# Patient Record
Sex: MALE | Race: WHITE | NOT HISPANIC OR LATINO | Employment: FULL TIME | URBAN - NONMETROPOLITAN AREA
[De-identification: names, ages, dates, MRNs, and addresses within clinical notes are randomized per-mention and may not be internally consistent; named-entity substitution may affect disease eponyms.]

---

## 2018-06-05 ENCOUNTER — TRANSCRIBE ORDERS (OUTPATIENT)
Dept: MRI IMAGING | Facility: HOSPITAL | Age: 23
End: 2018-06-05

## 2018-06-11 ENCOUNTER — TRANSCRIBE ORDERS (OUTPATIENT)
Dept: ADMINISTRATIVE | Facility: HOSPITAL | Age: 23
End: 2018-06-11

## 2018-06-11 DIAGNOSIS — R79.89 ELEVATED LFTS: Primary | ICD-10-CM

## 2018-06-11 DIAGNOSIS — G47.39 MIXED SLEEP APNEA: ICD-10-CM

## 2018-06-25 ENCOUNTER — HOSPITAL ENCOUNTER (OUTPATIENT)
Dept: MRI IMAGING | Facility: HOSPITAL | Age: 23
Discharge: HOME | End: 2018-06-25

## 2018-06-25 ENCOUNTER — HOSPITAL ENCOUNTER (OUTPATIENT)
Dept: ULTRASOUND IMAGING | Facility: HOSPITAL | Age: 23
Discharge: HOME | End: 2018-06-25
Admitting: INTERNAL MEDICINE

## 2018-06-25 DIAGNOSIS — G47.39 MIXED SLEEP APNEA: ICD-10-CM

## 2018-06-25 DIAGNOSIS — R79.89 ELEVATED LFTS: ICD-10-CM

## 2018-06-25 PROCEDURE — 70544 MR ANGIOGRAPHY HEAD W/O DYE: CPT

## 2018-06-25 PROCEDURE — 70553 MRI BRAIN STEM W/O & W/DYE: CPT

## 2018-06-25 PROCEDURE — 0 GADOBENATE DIMEGLUMINE 529 MG/ML SOLUTION: Performed by: INTERNAL MEDICINE

## 2018-06-25 PROCEDURE — A9577 INJ MULTIHANCE: HCPCS | Performed by: INTERNAL MEDICINE

## 2018-06-25 PROCEDURE — 76700 US EXAM ABDOM COMPLETE: CPT | Performed by: RADIOLOGY

## 2018-06-25 PROCEDURE — 70544 MR ANGIOGRAPHY HEAD W/O DYE: CPT | Performed by: RADIOLOGY

## 2018-06-25 PROCEDURE — 70553 MRI BRAIN STEM W/O & W/DYE: CPT | Performed by: RADIOLOGY

## 2018-06-25 PROCEDURE — 76700 US EXAM ABDOM COMPLETE: CPT

## 2018-06-25 RX ADMIN — GADOBENATE DIMEGLUMINE 15 ML: 529 INJECTION, SOLUTION INTRAVENOUS at 09:45

## 2022-04-08 ENCOUNTER — APPOINTMENT (OUTPATIENT)
Dept: GENERAL RADIOLOGY | Facility: HOSPITAL | Age: 27
End: 2022-04-08

## 2022-04-08 ENCOUNTER — HOSPITAL ENCOUNTER (EMERGENCY)
Facility: HOSPITAL | Age: 27
Discharge: HOME | End: 2022-04-08
Attending: STUDENT IN AN ORGANIZED HEALTH CARE EDUCATION/TRAINING PROGRAM | Admitting: STUDENT IN AN ORGANIZED HEALTH CARE EDUCATION/TRAINING PROGRAM

## 2022-04-08 VITALS
TEMPERATURE: 98.6 F | HEIGHT: 70 IN | RESPIRATION RATE: 18 BRPM | WEIGHT: 175 LBS | SYSTOLIC BLOOD PRESSURE: 132 MMHG | BODY MASS INDEX: 25.05 KG/M2 | OXYGEN SATURATION: 99 % | HEART RATE: 91 BPM | DIASTOLIC BLOOD PRESSURE: 86 MMHG

## 2022-04-08 DIAGNOSIS — S61.412A LACERATION OF LEFT HAND WITHOUT FOREIGN BODY, INITIAL ENCOUNTER: Primary | ICD-10-CM

## 2022-04-08 PROCEDURE — 90471 IMMUNIZATION ADMIN: CPT | Performed by: PHYSICIAN ASSISTANT

## 2022-04-08 PROCEDURE — 25010000002 TETANUS-DIPHTH-ACELL PERTUSSIS 5-2.5-18.5 LF-MCG/0.5 SUSPENSION PREFILLED SYRINGE: Performed by: PHYSICIAN ASSISTANT

## 2022-04-08 PROCEDURE — 73130 X-RAY EXAM OF HAND: CPT

## 2022-04-08 PROCEDURE — 90715 TDAP VACCINE 7 YRS/> IM: CPT | Performed by: PHYSICIAN ASSISTANT

## 2022-04-08 PROCEDURE — 73130 X-RAY EXAM OF HAND: CPT | Performed by: RADIOLOGY

## 2022-04-08 PROCEDURE — 99283 EMERGENCY DEPT VISIT LOW MDM: CPT

## 2022-04-08 PROCEDURE — 63710000001 ONDANSETRON ODT 4 MG TABLET DISPERSIBLE: Performed by: PHYSICIAN ASSISTANT

## 2022-04-08 RX ORDER — SULFAMETHOXAZOLE AND TRIMETHOPRIM 800; 160 MG/1; MG/1
1 TABLET ORAL 2 TIMES DAILY
Qty: 14 TABLET | Refills: 0 | Status: SHIPPED | OUTPATIENT
Start: 2022-04-08 | End: 2022-04-15

## 2022-04-08 RX ORDER — IBUPROFEN 600 MG/1
600 TABLET ORAL 3 TIMES DAILY
Qty: 30 TABLET | Refills: 0 | Status: SHIPPED | OUTPATIENT
Start: 2022-04-08 | End: 2022-04-08 | Stop reason: SDUPTHER

## 2022-04-08 RX ORDER — HYDROCODONE BITARTRATE AND ACETAMINOPHEN 5; 325 MG/1; MG/1
1 TABLET ORAL ONCE
Status: COMPLETED | OUTPATIENT
Start: 2022-04-08 | End: 2022-04-08

## 2022-04-08 RX ORDER — LIDOCAINE HYDROCHLORIDE 10 MG/ML
10 INJECTION, SOLUTION EPIDURAL; INFILTRATION; INTRACAUDAL; PERINEURAL ONCE
Status: COMPLETED | OUTPATIENT
Start: 2022-04-08 | End: 2022-04-08

## 2022-04-08 RX ORDER — SULFAMETHOXAZOLE AND TRIMETHOPRIM 800; 160 MG/1; MG/1
1 TABLET ORAL 2 TIMES DAILY
Qty: 14 TABLET | Refills: 0 | Status: SHIPPED | OUTPATIENT
Start: 2022-04-08 | End: 2022-04-08 | Stop reason: SDUPTHER

## 2022-04-08 RX ORDER — IBUPROFEN 600 MG/1
600 TABLET ORAL 3 TIMES DAILY
Qty: 30 TABLET | Refills: 0 | Status: SHIPPED | OUTPATIENT
Start: 2022-04-08 | End: 2023-01-17

## 2022-04-08 RX ORDER — ONDANSETRON 4 MG/1
4 TABLET, ORALLY DISINTEGRATING ORAL ONCE
Status: COMPLETED | OUTPATIENT
Start: 2022-04-08 | End: 2022-04-08

## 2022-04-08 RX ORDER — SULFAMETHOXAZOLE AND TRIMETHOPRIM 800; 160 MG/1; MG/1
1 TABLET ORAL ONCE
Status: COMPLETED | OUTPATIENT
Start: 2022-04-08 | End: 2022-04-08

## 2022-04-08 RX ADMIN — SULFAMETHOXAZOLE AND TRIMETHOPRIM 1 TABLET: 800; 160 TABLET ORAL at 17:15

## 2022-04-08 RX ADMIN — TETANUS TOXOID, REDUCED DIPHTHERIA TOXOID AND ACELLULAR PERTUSSIS VACCINE, ADSORBED 0.5 ML: 5; 2.5; 8; 8; 2.5 SUSPENSION INTRAMUSCULAR at 17:15

## 2022-04-08 RX ADMIN — HYDROCODONE BITARTRATE AND ACETAMINOPHEN 1 TABLET: 5; 325 TABLET ORAL at 15:01

## 2022-04-08 RX ADMIN — LIDOCAINE HYDROCHLORIDE 10 ML: 10 INJECTION, SOLUTION EPIDURAL; INFILTRATION; INTRACAUDAL; PERINEURAL at 14:27

## 2022-04-08 RX ADMIN — ONDANSETRON 4 MG: 4 TABLET, ORALLY DISINTEGRATING ORAL at 17:16

## 2022-04-08 NOTE — ED PROVIDER NOTES
Subjective   25 yo male pt presents to the ED with complaints of laceration to left palm. Patient states he works at [employer] and cut his hand on a piece of machinery. Patient states his tetanus shot is not up to date. Patient denies any other injuries or complaints. Bleeding controlled.           Review of Systems   Constitutional: Negative.    HENT: Negative.    Eyes: Negative.    Respiratory: Negative.    Cardiovascular: Negative.    Gastrointestinal: Negative.    Endocrine: Negative.    Genitourinary: Negative.    Musculoskeletal: Negative.    Skin: Positive for wound.   Allergic/Immunologic: Negative.    Neurological: Negative.    Hematological: Negative.    Psychiatric/Behavioral: Negative.    All other systems reviewed and are negative.      No past medical history on file.    No Known Allergies    No past surgical history on file.    No family history on file.    Social History     Socioeconomic History   • Marital status: Single           Objective   Physical Exam  Vitals and nursing note reviewed.   Constitutional:       Appearance: Normal appearance. He is normal weight.   HENT:      Head: Normocephalic and atraumatic.      Right Ear: External ear normal.      Left Ear: External ear normal.      Nose: Nose normal.      Mouth/Throat:      Mouth: Mucous membranes are moist.      Pharynx: Oropharynx is clear.   Eyes:      Extraocular Movements: Extraocular movements intact.      Conjunctiva/sclera: Conjunctivae normal.      Pupils: Pupils are equal, round, and reactive to light.   Cardiovascular:      Rate and Rhythm: Normal rate and regular rhythm.      Pulses: Normal pulses.      Heart sounds: Normal heart sounds.   Pulmonary:      Effort: Pulmonary effort is normal.      Breath sounds: Normal breath sounds.   Abdominal:      General: Abdomen is flat. Bowel sounds are normal.      Palpations: Abdomen is soft.   Musculoskeletal:         General: Normal range of motion.      Cervical back: Normal range of  motion and neck supple.   Skin:     General: Skin is warm and dry.      Capillary Refill: Capillary refill takes less than 2 seconds.          Neurological:      General: No focal deficit present.      Mental Status: He is alert and oriented to person, place, and time. Mental status is at baseline.   Psychiatric:         Mood and Affect: Mood normal.         Behavior: Behavior normal.         Thought Content: Thought content normal.         Judgment: Judgment normal.         Laceration Repair    Date/Time: 4/8/2022 5:43 PM  Performed by: Ashley Clemons PA  Authorized by: Ed Coates DO     Consent:     Consent obtained:  Verbal    Consent given by:  Patient    Risks, benefits, and alternatives were discussed: yes      Risks discussed:  Infection, pain, retained foreign body, tendon damage, vascular damage, poor wound healing, poor cosmetic result, need for additional repair and nerve damage    Alternatives discussed:  No treatment, delayed treatment, observation and referral  Universal protocol:     Procedure explained and questions answered to patient or proxy's satisfaction: yes      Relevant documents present and verified: yes      Test results available: yes      Imaging studies available: yes      Required blood products, implants, devices, and special equipment available: yes      Site/side marked: yes      Immediately prior to procedure, a time out was called: yes      Patient identity confirmed:  Verbally with patient, arm band, provided demographic data and hospital-assigned identification number  Anesthesia:     Anesthesia method:  Local infiltration    Local anesthetic:  Lidocaine 1% w/o epi  Laceration details:     Location:  Hand    Hand location:  L palm    Length (cm):  5  Pre-procedure details:     Preparation:  Patient was prepped and draped in usual sterile fashion  Treatment:     Area cleansed with:  Chlorhexidine and povidone-iodine    Amount of cleaning:  Standard    Irrigation  solution:  Sterile saline    Irrigation method:  Syringe    Visualized foreign bodies/material removed: no    Skin repair:     Repair method:  Sutures    Suture size:  4-0    Suture material:  Nylon    Suture technique:  Simple interrupted    Number of sutures:  11  Repair type:     Repair type:  Simple  Post-procedure details:     Dressing:  Bulky dressing    Procedure completion:  Tolerated well, no immediate complications               ED Course  ED Course as of 04/08/22 1744   Fri Apr 08, 2022   1554 XR Hand 3+ View Left    IMPRESSION:  No acute bony abnormality      This report was finalized on 4/8/2022 3:34 PM by Dr. Alec Baocn MD.             Specimen Collected: 04/08/22 15:34 Last Resulted: 04/08/22 15:34         [ML]      ED Course User Index  [ML] Ashley Clemons PA                                                 MDM  Number of Diagnoses or Management Options     Amount and/or Complexity of Data Reviewed  Tests in the radiology section of CPT®: ordered and reviewed    Risk of Complications, Morbidity, and/or Mortality  Presenting problems: low  Diagnostic procedures: low  Management options: low    Patient Progress  Patient progress: improved      Final diagnoses:   Laceration of left hand without foreign body, initial encounter       ED Disposition  ED Disposition     ED Disposition   Discharge    Condition   Stable    Comment   --             No follow-up provider specified.       Medication List      New Prescriptions    ibuprofen 600 MG tablet  Commonly known as: ADVIL,MOTRIN  Take 1 tablet by mouth 3 (Three) Times a Day.     sulfamethoxazole-trimethoprim 800-160 MG per tablet  Commonly known as: BACTRIM DS,SEPTRA DS  Take 1 tablet by mouth 2 (Two) Times a Day for 7 days.           Where to Get Your Medications      These medications were sent to [facility]  [address] [place] [address]    Hours: 8AM-6PM Mon-Fri Phone: [phone]   · ibuprofen 600 MG  tablet  · sulfamethoxazole-trimethoprim 800-160 MG per tablet          Ashley Clemons, PA  04/08/22 1144

## 2022-04-08 NOTE — DISCHARGE INSTRUCTIONS
Call one of the offices below to establish a primary care provider.  If you are unable to get an appointment and feel it is an emergency and need to be seen immediately please return to the Emergency Department.    Call one of the office below to set up a primary care provider.    Dr. Omega Olson                                                                                                       [address] [facility] [address]  [phone]    Dr. Jones, Dr. PILY Clark, Dr. STORMY Clark ([facility])  [address] [facility] [address]  [phone]    Dr. David, Dr. Olmstead, Dr. Mcgowan ([facility])  [address] [facility] [address]  [phone]    Dr. Harris  [address] [facility] [address]  [phone]    Dr. Nielsen, Dr. Cole, Dr. Barboza, Dr. Simmons ([facility])  [address] [place] [address] [address]  [facility] [address]  [phone]    Dr. Eunice Dao  [address] [facility] [address]  [phone]    Dr. Laura Casey  [address]   [address]  [facility] [address]  [phone]    Dr. Olson  [address] [facility] [address]  [phone]    Dr. Cortez, Dr. John  [address] [facility] [address]  [phone]    Dr. Ramos  [address]                                                              [address]  [facility] [address]  [phone]    Dr. Campos  [address] [facility] [address]  [phone]    Dr. Lilo Connell  [address]  [address]  [facility] [address]  [phone]    Dr. Sanchez and [facility]   [address] [facility]  [address]  [place] [address]  [phone]               Follow up with your primary care provider in 1 week for suture removal.

## 2022-12-20 ENCOUNTER — TELEPHONE (OUTPATIENT)
Dept: CARDIOLOGY | Facility: CLINIC | Age: 27
End: 2022-12-20

## 2022-12-20 NOTE — TELEPHONE ENCOUNTER
Called and spoke with Patient, explained to him that with CP, and pressure with SOA, he really needed to go to the ER to be checked out. Patient understood, and stated that that if it continued, or worsened, he would go to the ED.

## 2022-12-22 ENCOUNTER — HOSPITAL ENCOUNTER (EMERGENCY)
Facility: HOSPITAL | Age: 27
Discharge: HOME | End: 2022-12-23
Attending: EMERGENCY MEDICINE | Admitting: EMERGENCY MEDICINE

## 2022-12-22 DIAGNOSIS — R00.2 PALPITATIONS: ICD-10-CM

## 2022-12-22 DIAGNOSIS — J01.90 ACUTE BACTERIAL SINUSITIS: Primary | ICD-10-CM

## 2022-12-22 DIAGNOSIS — R00.0 TACHYCARDIA: ICD-10-CM

## 2022-12-22 DIAGNOSIS — B96.89 ACUTE BACTERIAL SINUSITIS: Primary | ICD-10-CM

## 2022-12-22 PROCEDURE — 99284 EMERGENCY DEPT VISIT MOD MDM: CPT

## 2022-12-22 PROCEDURE — 93005 ELECTROCARDIOGRAM TRACING: CPT | Performed by: EMERGENCY MEDICINE

## 2022-12-22 PROCEDURE — 93005 ELECTROCARDIOGRAM TRACING: CPT

## 2022-12-22 RX ORDER — SODIUM CHLORIDE 0.9 % (FLUSH) 0.9 %
10 SYRINGE (ML) INJECTION AS NEEDED
Status: DISCONTINUED | OUTPATIENT
Start: 2022-12-22 | End: 2022-12-23 | Stop reason: HOSPADM

## 2022-12-23 ENCOUNTER — APPOINTMENT (OUTPATIENT)
Dept: GENERAL RADIOLOGY | Facility: HOSPITAL | Age: 27
End: 2022-12-23

## 2022-12-23 ENCOUNTER — APPOINTMENT (OUTPATIENT)
Dept: CT IMAGING | Facility: HOSPITAL | Age: 27
End: 2022-12-23

## 2022-12-23 VITALS
DIASTOLIC BLOOD PRESSURE: 83 MMHG | SYSTOLIC BLOOD PRESSURE: 131 MMHG | BODY MASS INDEX: 30.06 KG/M2 | RESPIRATION RATE: 18 BRPM | TEMPERATURE: 98.2 F | OXYGEN SATURATION: 98 % | HEIGHT: 70 IN | WEIGHT: 210 LBS | HEART RATE: 85 BPM

## 2022-12-23 LAB
ALBUMIN SERPL-MCNC: 4.7 G/DL (ref 3.5–5.2)
ALBUMIN/GLOB SERPL: 1.6 G/DL
ALP SERPL-CCNC: 64 U/L (ref 39–117)
ALT SERPL W P-5'-P-CCNC: 71 U/L (ref 1–41)
ANION GAP SERPL CALCULATED.3IONS-SCNC: 10 MMOL/L (ref 5–15)
AST SERPL-CCNC: 40 U/L (ref 1–40)
BASOPHILS # BLD AUTO: 0.1 10*3/MM3 (ref 0–0.2)
BASOPHILS NFR BLD AUTO: 1.1 % (ref 0–1.5)
BILIRUB SERPL-MCNC: 0.3 MG/DL (ref 0–1.2)
BUN SERPL-MCNC: 17 MG/DL (ref 6–20)
BUN/CREAT SERPL: 15.9 (ref 7–25)
CALCIUM SPEC-SCNC: 9.6 MG/DL (ref 8.6–10.5)
CHLORIDE SERPL-SCNC: 104 MMOL/L (ref 98–107)
CO2 SERPL-SCNC: 27 MMOL/L (ref 22–29)
CREAT SERPL-MCNC: 1.07 MG/DL (ref 0.76–1.27)
DEPRECATED RDW RBC AUTO: 42.1 FL (ref 37–54)
EGFRCR SERPLBLD CKD-EPI 2021: 96.9 ML/MIN/1.73
EOSINOPHIL # BLD AUTO: 0.2 10*3/MM3 (ref 0–0.4)
EOSINOPHIL NFR BLD AUTO: 2.1 % (ref 0.3–6.2)
ERYTHROCYTE [DISTWIDTH] IN BLOOD BY AUTOMATED COUNT: 13.2 % (ref 12.3–15.4)
GLOBULIN UR ELPH-MCNC: 2.9 GM/DL
GLUCOSE SERPL-MCNC: 162 MG/DL (ref 65–99)
HCT VFR BLD AUTO: 45.1 % (ref 37.5–51)
HGB BLD-MCNC: 15.3 G/DL (ref 13–17.7)
HOLD SPECIMEN: NORMAL
IMM GRANULOCYTES # BLD AUTO: 0.1 10*3/MM3 (ref 0–0.05)
IMM GRANULOCYTES NFR BLD AUTO: 1.1 % (ref 0–0.5)
LYMPHOCYTES # BLD AUTO: 2.15 10*3/MM3 (ref 0.7–3.1)
LYMPHOCYTES NFR BLD AUTO: 23 % (ref 19.6–45.3)
MAGNESIUM SERPL-MCNC: 2 MG/DL (ref 1.6–2.6)
MCH RBC QN AUTO: 29.7 PG (ref 26.6–33)
MCHC RBC AUTO-ENTMCNC: 33.9 G/DL (ref 31.5–35.7)
MCV RBC AUTO: 87.4 FL (ref 79–97)
MONOCYTES # BLD AUTO: 0.74 10*3/MM3 (ref 0.1–0.9)
MONOCYTES NFR BLD AUTO: 7.9 % (ref 5–12)
NEUTROPHILS NFR BLD AUTO: 6.04 10*3/MM3 (ref 1.7–7)
NEUTROPHILS NFR BLD AUTO: 64.8 % (ref 42.7–76)
NRBC BLD AUTO-RTO: 0 /100 WBC (ref 0–0.2)
PLATELET # BLD AUTO: 276 10*3/MM3 (ref 140–450)
PMV BLD AUTO: 10.7 FL (ref 6–12)
POTASSIUM SERPL-SCNC: 4 MMOL/L (ref 3.5–5.2)
PROT SERPL-MCNC: 7.6 G/DL (ref 6–8.5)
QT INTERVAL: 302 MS
QT INTERVAL: 322 MS
QTC INTERVAL: 413 MS
QTC INTERVAL: 416 MS
RBC # BLD AUTO: 5.16 10*6/MM3 (ref 4.14–5.8)
SODIUM SERPL-SCNC: 141 MMOL/L (ref 136–145)
TROPONIN T SERPL-MCNC: <0.01 NG/ML (ref 0–0.03)
TSH SERPL DL<=0.05 MIU/L-ACNC: 1.1 UIU/ML (ref 0.27–4.2)
WBC NRBC COR # BLD: 9.33 10*3/MM3 (ref 3.4–10.8)
WHOLE BLOOD HOLD COAG: NORMAL
WHOLE BLOOD HOLD SPECIMEN: NORMAL

## 2022-12-23 PROCEDURE — 80050 GENERAL HEALTH PANEL: CPT | Performed by: EMERGENCY MEDICINE

## 2022-12-23 PROCEDURE — 84484 ASSAY OF TROPONIN QUANT: CPT | Performed by: EMERGENCY MEDICINE

## 2022-12-23 PROCEDURE — 71045 X-RAY EXAM CHEST 1 VIEW: CPT

## 2022-12-23 PROCEDURE — 71275 CT ANGIOGRAPHY CHEST: CPT

## 2022-12-23 PROCEDURE — 0 IOPAMIDOL PER 1 ML: Performed by: EMERGENCY MEDICINE

## 2022-12-23 PROCEDURE — 96365 THER/PROPH/DIAG IV INF INIT: CPT

## 2022-12-23 PROCEDURE — 83735 ASSAY OF MAGNESIUM: CPT | Performed by: EMERGENCY MEDICINE

## 2022-12-23 PROCEDURE — 25010000002 MAGNESIUM SULFATE IN D5W 1G/100ML (PREMIX) 1-5 GM/100ML-% SOLUTION: Performed by: EMERGENCY MEDICINE

## 2022-12-23 PROCEDURE — 93005 ELECTROCARDIOGRAM TRACING: CPT | Performed by: EMERGENCY MEDICINE

## 2022-12-23 PROCEDURE — 96375 TX/PRO/DX INJ NEW DRUG ADDON: CPT

## 2022-12-23 RX ORDER — METOPROLOL TARTRATE 5 MG/5ML
5 INJECTION INTRAVENOUS ONCE
Status: COMPLETED | OUTPATIENT
Start: 2022-12-23 | End: 2022-12-23

## 2022-12-23 RX ORDER — AMOXICILLIN AND CLAVULANATE POTASSIUM 875; 125 MG/1; MG/1
1 TABLET, FILM COATED ORAL EVERY 12 HOURS SCHEDULED
Qty: 14 TABLET | Refills: 0 | Status: SHIPPED | OUTPATIENT
Start: 2022-12-23 | End: 2023-01-17

## 2022-12-23 RX ORDER — MAGNESIUM SULFATE 1 G/100ML
1 INJECTION INTRAVENOUS ONCE
Status: COMPLETED | OUTPATIENT
Start: 2022-12-23 | End: 2022-12-23

## 2022-12-23 RX ADMIN — MAGNESIUM SULFATE 1 G: 1 INJECTION INTRAVENOUS at 01:48

## 2022-12-23 RX ADMIN — SODIUM CHLORIDE 1000 ML: 9 INJECTION, SOLUTION INTRAVENOUS at 01:45

## 2022-12-23 RX ADMIN — IOPAMIDOL 80 ML: 755 INJECTION, SOLUTION INTRAVENOUS at 01:06

## 2022-12-23 RX ADMIN — METOPROLOL TARTRATE 5 MG: 1 INJECTION, SOLUTION INTRAVENOUS at 01:48

## 2022-12-23 NOTE — ED PROVIDER NOTES
EMERGENCY DEPARTMENT ENCOUNTER    Pt Name: Jan Chavez  MRN: [id number]  Pt :   1995  Room Number:    Date of encounter:  2022  PCP: Provider, No Known  ED Provider: Baltazar Mahoney MD    Historian: Patient      HPI:  Chief Complaint: chest pain and palpitation         Context: Jan Chavez is a 27 y.o. male who presents to the ED c/o patient onset 1 to 2 hours ago, while watching football, associated with some chest discomfort.  States prior history of the same, over the past 2 months.  Symptoms present for hours, but better here heart rate was 158 now he notices on his apple watch 110    Also complains of 2 weeks of sinus congestion drainage and pain, with an occasional cough  PAST MEDICAL HISTORY  No past medical history on file.      PAST SURGICAL HISTORY  No past surgical history on file.      FAMILY HISTORY  No family history on file.      SOCIAL HISTORY  Social History     Socioeconomic History   • Marital status: Single         ALLERGIES  Patient has no known allergies.        REVIEW OF SYSTEMS  Review of Systems       Constitutional: Negative. No fever, no weakness.   HENT: Negative for sneezing and sore throat.    Respiratory: Negative for cough. Negative for shortness of breath.    Cardiovascular: Negative.  Negative for chest pain.   Gastrointestinal: Negative.  Negative for abdominal pain.   Genitourinary: Negative.  Negative for difficulty urinating.     All systems reviewwed and negative except as noted in HPI.    PHYSICAL EXAM    I have reviewed the triage vital signs and nursing notes.    ED Triage Vitals [22 2349]   Temp Heart Rate Resp BP SpO2   98.2 °F (36.8 °C) 114 18 (!) 154/101 99 %      Temp src Heart Rate Source Patient Position BP Location FiO2 (%)   Oral -- -- -- --       Physical Exam  GENERAL:   Appears alert, oriented, appears concerned, or anxious  HENT: Nares patent.  EYES: No scleral icterus.  CV: Tachycardia no edema  RESPIRATORY: Normal effort.   No audible wheezes, rales or rhonchi.  ABDOMEN: Soft, nontender  MUSCULOSKELETAL: No deformities.   NEURO: Alert, moves all extremities, follows commands.  SKIN: Warm, dry, no rash visualized.        LAB RESULTS  Recent Results (from the past 24 hour(s))   ECG 12 Lead Chest Pain    Collection Time: 12/22/22 11:57 PM   Result Value Ref Range    QT Interval 302 ms    QTC Interval 416 ms   Green Top (Gel)    Collection Time: 12/23/22 12:36 AM   Result Value Ref Range    Extra Tube Hold for add-ons.    Lavender Top    Collection Time: 12/23/22 12:36 AM   Result Value Ref Range    Extra Tube hold for add-on    Gold Top - SST    Collection Time: 12/23/22 12:36 AM   Result Value Ref Range    Extra Tube Hold for add-ons.    Light Blue Top    Collection Time: 12/23/22 12:36 AM   Result Value Ref Range    Extra Tube Hold for add-ons.    Comprehensive Metabolic Panel    Collection Time: 12/23/22 12:36 AM    Specimen: Blood   Result Value Ref Range    Glucose 162 (H) 65 - 99 mg/dL    BUN 17 6 - 20 mg/dL    Creatinine 1.07 0.76 - 1.27 mg/dL    Sodium 141 136 - 145 mmol/L    Potassium 4.0 3.5 - 5.2 mmol/L    Chloride 104 98 - 107 mmol/L    CO2 27.0 22.0 - 29.0 mmol/L    Calcium 9.6 8.6 - 10.5 mg/dL    Total Protein 7.6 6.0 - 8.5 g/dL    Albumin 4.70 3.50 - 5.20 g/dL    ALT (SGPT) 71 (H) 1 - 41 U/L    AST (SGOT) 40 1 - 40 U/L    Alkaline Phosphatase 64 39 - 117 U/L    Total Bilirubin 0.3 0.0 - 1.2 mg/dL    Globulin 2.9 gm/dL    A/G Ratio 1.6 g/dL    BUN/Creatinine Ratio 15.9 7.0 - 25.0    Anion Gap 10.0 5.0 - 15.0 mmol/L    eGFR 96.9 >60.0 mL/min/1.73   Troponin    Collection Time: 12/23/22 12:36 AM    Specimen: Blood   Result Value Ref Range    Troponin T <0.010 0.000 - 0.030 ng/mL   CBC Auto Differential    Collection Time: 12/23/22 12:36 AM    Specimen: Blood   Result Value Ref Range    WBC 9.33 3.40 - 10.80 10*3/mm3    RBC 5.16 4.14 - 5.80 10*6/mm3    Hemoglobin 15.3 13.0 - 17.7 g/dL    Hematocrit 45.1 37.5 - 51.0 %    MCV  87.4 79.0 - 97.0 fL    MCH 29.7 26.6 - 33.0 pg    MCHC 33.9 31.5 - 35.7 g/dL    RDW 13.2 12.3 - 15.4 %    RDW-SD 42.1 37.0 - 54.0 fl    MPV 10.7 6.0 - 12.0 fL    Platelets 276 140 - 450 10*3/mm3    Neutrophil % 64.8 42.7 - 76.0 %    Lymphocyte % 23.0 19.6 - 45.3 %    Monocyte % 7.9 5.0 - 12.0 %    Eosinophil % 2.1 0.3 - 6.2 %    Basophil % 1.1 0.0 - 1.5 %    Immature Grans % 1.1 (H) 0.0 - 0.5 %    Neutrophils, Absolute 6.04 1.70 - 7.00 10*3/mm3    Lymphocytes, Absolute 2.15 0.70 - 3.10 10*3/mm3    Monocytes, Absolute 0.74 0.10 - 0.90 10*3/mm3    Eosinophils, Absolute 0.20 0.00 - 0.40 10*3/mm3    Basophils, Absolute 0.10 0.00 - 0.20 10*3/mm3    Immature Grans, Absolute 0.10 (H) 0.00 - 0.05 10*3/mm3    nRBC 0.0 0.0 - 0.2 /100 WBC   Magnesium    Collection Time: 12/23/22 12:36 AM    Specimen: Blood   Result Value Ref Range    Magnesium 2.0 1.6 - 2.6 mg/dL   TSH    Collection Time: 12/23/22 12:36 AM    Specimen: Blood   Result Value Ref Range    TSH 1.100 0.270 - 4.200 uIU/mL   ECG 12 Lead Chest Pain    Collection Time: 12/23/22  1:34 AM   Result Value Ref Range    QT Interval 322 ms    QTC Interval 413 ms       If labs were ordered, I independently reviewed the results.        RADIOLOGY  XR Chest 1 View    Result Date: 12/23/2022  EXAM:  XR CHEST 1 VW   DATE: 12/23/2022 12:12 AM HISTORY: chest pain COMPARISON:  None. FINDINGS and     1.  No consolidation or pleural effusion 2.  Heart size is normal. 3.  No acute bony findings. 4.  Incidental azygous lobe. Electronically signed by:  Dora Mathews M.D.  12/22/2022 11:21 PM Mountain Time    CT Angiogram Chest Pulmonary Embolism    Result Date: 12/23/2022  EXAM:  CTA CHEST WITH IV CONTRAST, CT PULMONARY ANGIOGRAM DATE: 12/23/2022 1:01 AM HISTORY:   Pulmonary embolism (PE) suspected, high prob TECHNIQUE: CTA examination of the chest was performed following the intravenous administration of 80 mL Isovue-370. Sagittal, coronal and 3-D reformatted images were provided. CT  dose lowering techniques were used, to include: automated exposure control,  adjustment for patient size, and or use of iterative reconstruction. COMPARISON:  None. FINDINGS: THYROID: Normal. LUNGS/PLEURA: Incidental azygous lobe. MEDIASTINUM/DORA: Normal. CARDIOVASCULAR:  Normal heart.   Unremarkable aorta.  Normal pulmonary arteries. CHEST WALL: Normal. MUSCULOSKELETAL: Normal. UPPER ABDOMEN: Normal.     1.  No pulmonary embolus. 2.  Unremarkable aorta Electronically signed by:  Dora Mathews M.D.  12/22/2022 11:21 PM Mountain Time      I ordered and reviewed the above noted radiographic studies.          PROCEDURES    Procedures    ECG 12 Lead Chest Pain   Preliminary Result   Test Reason : Chest Pain   Blood Pressure :   */*   mmHG   Vent. Rate :  99 BPM     Atrial Rate :  99 BPM      P-R Int : 126 ms          QRS Dur :  84 ms       QT Int : 322 ms       P-R-T Axes :  33  50  31 degrees      QTc Int : 413 ms      Normal sinus rhythm   Nonspecific T wave abnormality   Abnormal ECG   When compared with ECG of 22-DEC-2022 23:57,   No significant change was found      Referred By: EDMD           Confirmed By:       ECG 12 Lead Chest Pain   Final Result   Test Reason : Chest Pain   Blood Pressure :   */*   mmHG   Vent. Rate : 114 BPM     Atrial Rate : 114 BPM      P-R Int : 130 ms          QRS Dur :  82 ms       QT Int : 302 ms       P-R-T Axes :  25  60  27 degrees      QTc Int : 416 ms      Sinus tachycardia   Nonspecific T wave abnormality   Abnormal ECG   No previous ECGs available   Confirmed by EVAN ORTA ([id number]) on 12/23/2022 12:48:22 AM      Referred By: EDMD           Confirmed By: EVAN ORTA          MEDICATIONS GIVEN IN ER    Medications   sodium chloride 0.9 % flush 10 mL (has no administration in time range)   magnesium sulfate in D5W 1g/100mL (PREMIX) (1 g Intravenous New Bag 12/23/22 0148)   sodium chloride 0.9 % bolus 1,000 mL (1,000 mL Intravenous New Bag 12/23/22 0145)   metoprolol  tartrate (LOPRESSOR) injection 5 mg (5 mg Intravenous Given 12/23/22 0148)   iopamidol (ISOVUE-370) 76 % injection 100 mL (80 mL Intravenous Given 12/23/22 0106)         PROGRESS, DATA ANALYSIS, CONSULTS, AND MEDICAL DECISION MAKING    All labs have been independently reviewed by me.  All radiology studies have been reviewed by me and the radiologist dictating the report.   EKG's have been independently viewed and interpreted by me.      Differential diagnoses: Arrhythmia, chest pain, MI, pulmonary embolism.           CT scan shows no PE, pneumonia or mass.  EKG shows sinus tachycardia, with observation, and recheck it is normal sinus rhythm with a rate of 90.  We have trialed a dose of metoprolol here which she has tolerated.  I will recommend follow-up with his clinic physician, or cardiology whom he has an appointment for in 2 weeks, for chest pain follow-up, and consideration of Holter monitor for palpitations.      AS OF 02:02 EST VITALS:    BP - 129/94  HR - 118  TEMP - 98.2 °F (36.8 °C) (Oral)  O2 SATS - 97%                  DIAGNOSIS  Final diagnoses:   Acute bacterial sinusitis   Tachycardia   Palpitations         DISPOSITION  DISCHARGE    Patient discharged in stable condition.    Reviewed implications of results, diagnosis, meds, responsibility to follow up, warning signs and symptoms of possible worsening, potential complications and reasons to return to ER.    Patient/Family voiced understanding of above instructions.    Discussed plan for discharge, as there is no emergent indication for admission.  Pt/family is agreeable and understands need for follow up and possible repeat testing.  Pt/family is aware that discharge does not mean that nothing is wrong but that it indicates no emergency is currently present that requires admission and they must continue care with follow-up as given below or with a physician of their choice.     FOLLOW-UP  Provider, No Known  Psychiatric  [id number]               Medication List      New Prescriptions    amoxicillin-clavulanate 875-125 MG per tablet  Commonly known as: AUGMENTIN  Take 1 tablet by mouth Every 12 (Twelve) Hours.           Where to Get Your Medications      These medications were sent to [facility]/pharmacy #[id number] - [place], [place] - [address] - [phone]  - [phone]  [address] , [facility] [address]    Hours: 24-hours Phone: [phone]   · amoxicillin-clavulanate 875-125 MG per tablet                  Baltazar Mahoney MD  12/23/22 4879

## 2023-01-09 ENCOUNTER — TRANSCRIBE ORDERS (OUTPATIENT)
Dept: ADMINISTRATIVE | Facility: HOSPITAL | Age: 28
End: 2023-01-09
Payer: COMMERCIAL

## 2023-01-09 DIAGNOSIS — R06.83 SNORING: Primary | ICD-10-CM

## 2023-01-17 ENCOUNTER — OFFICE VISIT (OUTPATIENT)
Dept: CARDIOLOGY | Facility: CLINIC | Age: 28
End: 2023-01-17
Payer: COMMERCIAL

## 2023-01-17 ENCOUNTER — LAB (OUTPATIENT)
Dept: LAB | Facility: HOSPITAL | Age: 28
End: 2023-01-17
Payer: COMMERCIAL

## 2023-01-17 VITALS
HEART RATE: 79 BPM | OXYGEN SATURATION: 98 % | HEIGHT: 70 IN | WEIGHT: 198.4 LBS | SYSTOLIC BLOOD PRESSURE: 121 MMHG | BODY MASS INDEX: 28.4 KG/M2 | DIASTOLIC BLOOD PRESSURE: 83 MMHG

## 2023-01-17 DIAGNOSIS — R00.2 PALPITATIONS: ICD-10-CM

## 2023-01-17 DIAGNOSIS — R07.89 OTHER CHEST PAIN: ICD-10-CM

## 2023-01-17 DIAGNOSIS — R07.89 OTHER CHEST PAIN: Primary | ICD-10-CM

## 2023-01-17 PROBLEM — R07.9 CHRONIC CHEST PAIN WITH HIGH RISK FOR CAD: Status: ACTIVE | Noted: 2023-01-17

## 2023-01-17 PROBLEM — G89.29 CHRONIC CHEST PAIN WITH HIGH RISK FOR CAD: Status: ACTIVE | Noted: 2023-01-17

## 2023-01-17 PROBLEM — Z91.89 CHRONIC CHEST PAIN WITH HIGH RISK FOR CAD: Status: ACTIVE | Noted: 2023-01-17

## 2023-01-17 PROCEDURE — 93000 ELECTROCARDIOGRAM COMPLETE: CPT | Performed by: INTERNAL MEDICINE

## 2023-01-17 PROCEDURE — 36415 COLL VENOUS BLD VENIPUNCTURE: CPT

## 2023-01-17 PROCEDURE — 84443 ASSAY THYROID STIM HORMONE: CPT

## 2023-01-17 PROCEDURE — 84479 ASSAY OF THYROID (T3 OR T4): CPT

## 2023-01-17 PROCEDURE — 99204 OFFICE O/P NEW MOD 45 MIN: CPT | Performed by: INTERNAL MEDICINE

## 2023-01-17 PROCEDURE — 84436 ASSAY OF TOTAL THYROXINE: CPT

## 2023-01-17 RX ORDER — ISOSORBIDE MONONITRATE 30 MG/1
30 TABLET, EXTENDED RELEASE ORAL DAILY
Qty: 30 TABLET | Refills: 11 | Status: SHIPPED | OUTPATIENT
Start: 2023-01-17 | End: 2023-01-31 | Stop reason: SINTOL

## 2023-01-17 RX ORDER — OMEPRAZOLE 40 MG/1
40 CAPSULE, DELAYED RELEASE ORAL DAILY
COMMUNITY
End: 2023-02-01 | Stop reason: SDUPTHER

## 2023-01-17 NOTE — PROGRESS NOTES
Office Note    Subjective     Jan Chavez is a 27 y.o. male who presents to day for evaluation of chest pain and palpitations.      Active Problems:  Problem List Items Addressed This Visit        Cardiac and Vasculature    Palpitations    Relevant Orders    Adult Transthoracic Echo Complete w/ Color, Spectral and Contrast if necessary per protocol    Stress Test With Myocardial Perfusion One Day    ECG 12 Lead (Completed)    Cardiac Event Monitor    Thyroid Panel With TSH (Completed)   Other Visit Diagnoses     Other chest pain    -  Primary    Relevant Orders    Adult Transthoracic Echo Complete w/ Color, Spectral and Contrast if necessary per protocol    Stress Test With Myocardial Perfusion One Day    ECG 12 Lead (Completed)    Cardiac Event Monitor    Thyroid Panel With TSH (Completed)    H. Pylori Breath Test - Breath, Lung          HPI  Patient is here for evaluation.  Patient has had retrosternal chest pain with associated shortness of breath.  Patient has been to the emergency room in end of December also with similar complaints.  Sometimes the pain is 6/10 in intensity.  Sometimes associated with food and sometimes needs at rest.  Denies any nausea vomiting.  Has associated shortness of breath.  He is going to get a sleep study and evaluation for any nasal septal issues also.    Patient has extreme tiredness.  Patient also experiences a lot of palpitations no blackouts.  Patient does carry cardiac history with his great grand father having heart disease at a younger age.    PRIOR MEDS  Current Outpatient Medications on File Prior to Visit   Medication Sig Dispense Refill   • omeprazole (priLOSEC) 40 MG capsule Take 40 mg by mouth Daily.       No current facility-administered medications on file prior to visit.       ALLERGIES  Patient has no known allergies.    HISTORY  History reviewed. No pertinent past medical history.    Social History     Socioeconomic History   • Marital status: Single   Tobacco  "Use   • Smoking status: Never     Passive exposure: Never   • Smokeless tobacco: Current     Types: Snuff   Vaping Use   • Vaping Use: Never used   Substance and Sexual Activity   • Alcohol use: Never   • Drug use: Never   • Sexual activity: Defer       Family History   Problem Relation Age of Onset   • Hypertension Father    • Heart failure Paternal Grandfather    • Heart attack Paternal Grandfather        Review of Systems   Constitutional: Negative for activity change, appetite change, chills, diaphoresis, fatigue, fever and unexpected weight change.   HENT: Negative for congestion, ear discharge, ear pain, hearing loss, nosebleeds, sore throat and tinnitus.    Eyes: Negative for redness and visual disturbance.   Respiratory: Positive for cough, chest tightness and shortness of breath. Negative for apnea, choking, wheezing and stridor.    Cardiovascular: Positive for chest pain and palpitations. Negative for leg swelling.   Gastrointestinal: Negative for abdominal distention, abdominal pain, constipation, diarrhea, nausea and vomiting.   Endocrine: Negative for cold intolerance, heat intolerance, polydipsia, polyphagia and polyuria.   Genitourinary: Negative for difficulty urinating, flank pain, frequency, hematuria and urgency.   Musculoskeletal: Negative for arthralgias, back pain, gait problem, joint swelling, myalgias and neck pain.   Skin: Negative for pallor and rash.   Neurological: Negative for dizziness, tremors, seizures, syncope, weakness, light-headedness and headaches.   Hematological: Does not bruise/bleed easily.   Psychiatric/Behavioral: Negative for agitation, hallucinations, self-injury, sleep disturbance and suicidal ideas. The patient is not nervous/anxious.        Objective     VITALS: /83 (BP Location: Left arm, Patient Position: Sitting, Cuff Size: Large Adult)   Pulse 79   Ht 177.8 cm (70\")   Wt 90 kg (198 lb 6.4 oz)   SpO2 98%   BMI 28.47 kg/m²     LABS:   Lab on 01/17/2023 "   Component Date Value Ref Range Status   • TSH 01/17/2023 0.338  0.270 - 4.200 uIU/mL Final   • T Uptake 01/17/2023 1.00  0.80 - 1.30 TBI Final   • T4, Total 01/17/2023 7.12  4.50 - 11.70 mcg/dL Final    T4 results may be falsely increased if patient taking Biotin.   Admission on 12/22/2022, Discharged on 12/23/2022   Component Date Value Ref Range Status   • QT Interval 12/22/2022 302  ms Final   • QTC Interval 12/22/2022 416  ms Final   • Extra Tube 12/23/2022 Hold for add-ons.   Final    Auto resulted.   • Extra Tube 12/23/2022 hold for add-on   Final    Auto resulted   • Extra Tube 12/23/2022 Hold for add-ons.   Final    Auto resulted.   • Extra Tube 12/23/2022 Hold for add-ons.   Final    Auto resulted.   • Extra Tube 12/23/2022 Hold for add-ons.   Final    Auto resulted   • QT Interval 12/23/2022 322  ms Final   • QTC Interval 12/23/2022 413  ms Final   • Glucose 12/23/2022 162 (H)  65 - 99 mg/dL Final   • BUN 12/23/2022 17  6 - 20 mg/dL Final   • Creatinine 12/23/2022 1.07  0.76 - 1.27 mg/dL Final   • Sodium 12/23/2022 141  136 - 145 mmol/L Final   • Potassium 12/23/2022 4.0  3.5 - 5.2 mmol/L Final    Slight hemolysis detected by analyzer. Results may be affected.   • Chloride 12/23/2022 104  98 - 107 mmol/L Final   • CO2 12/23/2022 27.0  22.0 - 29.0 mmol/L Final   • Calcium 12/23/2022 9.6  8.6 - 10.5 mg/dL Final   • Total Protein 12/23/2022 7.6  6.0 - 8.5 g/dL Final   • Albumin 12/23/2022 4.70  3.50 - 5.20 g/dL Final   • ALT (SGPT) 12/23/2022 71 (H)  1 - 41 U/L Final   • AST (SGOT) 12/23/2022 40  1 - 40 U/L Final   • Alkaline Phosphatase 12/23/2022 64  39 - 117 U/L Final   • Total Bilirubin 12/23/2022 0.3  0.0 - 1.2 mg/dL Final   • Globulin 12/23/2022 2.9  gm/dL Final    Calculated Result   • A/G Ratio 12/23/2022 1.6  g/dL Final   • BUN/Creatinine Ratio 12/23/2022 15.9  7.0 - 25.0 Final   • Anion Gap 12/23/2022 10.0  5.0 - 15.0 mmol/L Final   • eGFR 12/23/2022 96.9  >60.0 mL/min/1.73 Final    National  Kidney Foundation and American Society of Nephrology (ASN) Task Force recommended calculation based on the Chronic Kidney Disease Epidemiology Collaboration (CKD-EPI) equation refit without adjustment for race.   • Troponin T 12/23/2022 <0.010  0.000 - 0.030 ng/mL Final   • WBC 12/23/2022 9.33  3.40 - 10.80 10*3/mm3 Final   • RBC 12/23/2022 5.16  4.14 - 5.80 10*6/mm3 Final   • Hemoglobin 12/23/2022 15.3  13.0 - 17.7 g/dL Final   • Hematocrit 12/23/2022 45.1  37.5 - 51.0 % Final   • MCV 12/23/2022 87.4  79.0 - 97.0 fL Final   • MCH 12/23/2022 29.7  26.6 - 33.0 pg Final   • MCHC 12/23/2022 33.9  31.5 - 35.7 g/dL Final   • RDW 12/23/2022 13.2  12.3 - 15.4 % Final   • RDW-SD 12/23/2022 42.1  37.0 - 54.0 fl Final   • MPV 12/23/2022 10.7  6.0 - 12.0 fL Final   • Platelets 12/23/2022 276  140 - 450 10*3/mm3 Final   • Neutrophil % 12/23/2022 64.8  42.7 - 76.0 % Final   • Lymphocyte % 12/23/2022 23.0  19.6 - 45.3 % Final   • Monocyte % 12/23/2022 7.9  5.0 - 12.0 % Final   • Eosinophil % 12/23/2022 2.1  0.3 - 6.2 % Final   • Basophil % 12/23/2022 1.1  0.0 - 1.5 % Final   • Immature Grans % 12/23/2022 1.1 (H)  0.0 - 0.5 % Final   • Neutrophils, Absolute 12/23/2022 6.04  1.70 - 7.00 10*3/mm3 Final   • Lymphocytes, Absolute 12/23/2022 2.15  0.70 - 3.10 10*3/mm3 Final   • Monocytes, Absolute 12/23/2022 0.74  0.10 - 0.90 10*3/mm3 Final   • Eosinophils, Absolute 12/23/2022 0.20  0.00 - 0.40 10*3/mm3 Final   • Basophils, Absolute 12/23/2022 0.10  0.00 - 0.20 10*3/mm3 Final   • Immature Grans, Absolute 12/23/2022 0.10 (H)  0.00 - 0.05 10*3/mm3 Final   • nRBC 12/23/2022 0.0  0.0 - 0.2 /100 WBC Final   • Magnesium 12/23/2022 2.0  1.6 - 2.6 mg/dL Final   • TSH 12/23/2022 1.100  0.270 - 4.200 uIU/mL Final         IMAGING:   XR Chest 1 View    Result Date: 12/23/2022  1.  No consolidation or pleural effusion 2.  Heart size is normal. 3.  No acute bony findings. 4.  Incidental azygous lobe. Electronically signed by:  Dora Mathews M.D.   12/22/2022 11:21 PM [place] [facility]    CT Angiogram Chest Pulmonary Embolism    Result Date: 12/23/2022  1.  No pulmonary embolus. 2.  Unremarkable aorta Electronically signed by:  Dora Mathews M.D.  12/22/2022 11:21 PM [place]    No results found for this or any previous visit.     No results found for this or any previous visit.          EXAM:  Physical Exam  Vitals and nursing note reviewed.   Constitutional:       General: He is not in acute distress.     Appearance: Normal appearance. He is not ill-appearing or diaphoretic.   HENT:      Head: Normocephalic and atraumatic.      Right Ear: External ear normal.      Left Ear: External ear normal.      Nose: Nose normal. No congestion or rhinorrhea.      Mouth/Throat:      Mouth: Mucous membranes are moist.      Pharynx: No oropharyngeal exudate.   Eyes:      Extraocular Movements: Extraocular movements intact.      Conjunctiva/sclera: Conjunctivae normal.      Pupils: Pupils are equal, round, and reactive to light.   Neck:      Vascular: No carotid bruit.   Cardiovascular:      Rate and Rhythm: Normal rate and regular rhythm.      Pulses: Normal pulses.      Heart sounds: Normal heart sounds, S1 normal and S2 normal. No murmur heard.   No systolic murmur is present.   No diastolic murmur is present.  Pulmonary:      Effort: Pulmonary effort is normal. No respiratory distress.      Breath sounds: Normal breath sounds. No wheezing, rhonchi or rales.   Abdominal:      General: Abdomen is flat. Bowel sounds are normal.      Palpations: Abdomen is soft.   Musculoskeletal:         General: No swelling, tenderness, deformity or signs of injury.      Cervical back: Normal range of motion and neck supple.      Right lower leg: No edema.      Left lower leg: No edema.   Skin:     General: Skin is warm.      Coloration: Skin is not jaundiced.      Findings: No bruising, erythema or rash.   Neurological:      General: No focal deficit present.      Mental Status: He is  alert and oriented to person, place, and time. Mental status is at baseline.   Psychiatric:         Mood and Affect: Mood normal.         Behavior: Behavior normal.         Thought Content: Thought content normal.         Judgment: Judgment normal.         Procedure     ECG 12 Lead    Date/Time: 1/17/2023 2:29 PM  Performed by: Vadim Vivas MD  Authorized by: Vadim Vivas MD   Comparison: compared with previous ECG from 12/22/2022  Comparison to previous ECG: On previous EKG T flattening noted in the inferolateral leads  Comments: EKG showed sinus rhythm at 87, normal axis, normal intervals, T inversion noted inferolateral leads.               Assessment & Plan     Diagnoses and all orders for this visit:    1. Other chest pain (Primary)  -     Cancel: POC Urea Breath Test  -     Adult Transthoracic Echo Complete w/ Color, Spectral and Contrast if necessary per protocol; Future  -     Stress Test With Myocardial Perfusion One Day; Future  -     ECG 12 Lead  -     Cardiac Event Monitor; Future  -     Thyroid Panel With TSH; Future  -     H. Pylori Breath Test - Breath, Lung; Future    2. Palpitations  -     Adult Transthoracic Echo Complete w/ Color, Spectral and Contrast if necessary per protocol; Future  -     Stress Test With Myocardial Perfusion One Day; Future  -     ECG 12 Lead  -     Cardiac Event Monitor; Future  -     Thyroid Panel With TSH; Future    Other orders  -     isosorbide mononitrate (IMDUR) 30 MG 24 hr tablet; Take 1 tablet by mouth Daily.  Dispense: 30 tablet; Refill: 11      Plan  #1 cardiac.  Patient has been having chest pains with palpitations.  Associated shortness of breath.  Need to rule out cardiac versus noncardiac causes of same.  We will get a stress and echo done.  We will check your blood test may need EGD also.  Event recorder would be placed.  TSH will be checked.  Aggressive risk factor modification coronary disease to continue.               MEDS ORDERED DURING  VISIT:    Medications Discontinued During This Encounter   Medication Reason   • amoxicillin-clavulanate (AUGMENTIN) 875-125 MG per tablet Historical Med - Therapy completed   • ibuprofen (ADVIL,MOTRIN) 600 MG tablet Historical Med - Therapy completed        New Medications Ordered This Visit   Medications   • isosorbide mononitrate (IMDUR) 30 MG 24 hr tablet     Sig: Take 1 tablet by mouth Daily.     Dispense:  30 tablet     Refill:  11         Follow Up:   Return in about 4 weeks (around 2/14/2023).    Patient was given instructions and counseling regarding his condition or for health maintenance advice. Please see specific information pulled into the AVS if appropriate.   As always, Provider, No Known  I appreciate very much the opportunity to participate in the cardiovascular care of your patients. Please do not hesitate to call me with any questions with regards to Jan Chavez evaluation and management.         This document has been electronically signed by Vadim Vivas MD [facility], Interventional Cardiology  January 30, 2023 17:45 EST    Dictated Utilizing Dragon Dictation: Part of this note may be an electronic transcription/translation of spoken language to printed text using the Dragon Dictation System.  .Electronically signed by Vadim Vivas MD, 01/17/23, 2:32 PM EST.

## 2023-01-18 LAB
T-UPTAKE NFR SERPL: 1 TBI (ref 0.8–1.3)
T4 SERPL-MCNC: 7.12 MCG/DL (ref 4.5–11.7)
TSH SERPL DL<=0.05 MIU/L-ACNC: 0.34 UIU/ML (ref 0.27–4.2)

## 2023-01-25 ENCOUNTER — PATIENT ROUNDING (BHMG ONLY) (OUTPATIENT)
Dept: CARDIOLOGY | Facility: CLINIC | Age: 28
End: 2023-01-25
Payer: COMMERCIAL

## 2023-01-25 NOTE — PROGRESS NOTES
January 25, 2023    Hello, may I speak with Jan Scott?    My name is Kaela      I am  with [facility] CARDIOLOG[facility]  [address]  [place] [address] [phone].    Before we get started may I verify your date of birth? 1995    I am calling to officially welcome you to our practice and ask about your recent visit. Is this a good time to talk? yes    Tell me about your visit with us. What things went well?  Visit went good, liked Dr. Vivas.       We're always looking for ways to make our patients' experiences even better. Do you have recommendations on ways we may improve?  no    Overall were you satisfied with your first visit to our practice? yes       I appreciate you taking the time to speak with me today. Is there anything else I can do for you? no      Thank you, and have a great day.

## 2023-01-30 ENCOUNTER — TELEPHONE (OUTPATIENT)
Dept: CARDIOLOGY | Facility: CLINIC | Age: 28
End: 2023-01-30
Payer: COMMERCIAL

## 2023-01-30 NOTE — TELEPHONE ENCOUNTER
spk with the patient concerning his current symptoms and he states the CP comes and goes with tightness and heavy feeling and once his HR got to 140s, complains the Imdur causes him unbearable headaches, that he is using Ibuprofen for, explained to the patient if symptoms continue to go the ED to be evaluated.

## 2023-01-31 ENCOUNTER — HOSPITAL ENCOUNTER (OUTPATIENT)
Dept: NUCLEAR MEDICINE | Facility: HOSPITAL | Age: 28
Discharge: HOME | End: 2023-01-31
Payer: COMMERCIAL

## 2023-01-31 ENCOUNTER — HOSPITAL ENCOUNTER (OUTPATIENT)
Dept: CARDIOLOGY | Facility: HOSPITAL | Age: 28
Discharge: HOME | End: 2023-01-31
Payer: COMMERCIAL

## 2023-01-31 DIAGNOSIS — R00.2 PALPITATIONS: ICD-10-CM

## 2023-01-31 DIAGNOSIS — R07.89 OTHER CHEST PAIN: ICD-10-CM

## 2023-01-31 LAB
BH CV ECHO MEAS - ACS: 1.9 CM
BH CV ECHO MEAS - AO MAX PG: 3.9 MMHG
BH CV ECHO MEAS - AO MEAN PG: 2 MMHG
BH CV ECHO MEAS - AO ROOT DIAM: 3 CM
BH CV ECHO MEAS - AO V2 MAX: 99 CM/SEC
BH CV ECHO MEAS - AO V2 VTI: 19.5 CM
BH CV ECHO MEAS - EDV(CUBED): 68.9 ML
BH CV ECHO MEAS - EDV(MOD-SP4): 74.4 ML
BH CV ECHO MEAS - EF(MOD-BP): 67 %
BH CV ECHO MEAS - EF(MOD-SP4): 67.6 %
BH CV ECHO MEAS - ESV(CUBED): 24.4 ML
BH CV ECHO MEAS - ESV(MOD-SP4): 24.1 ML
BH CV ECHO MEAS - FS: 29.3 %
BH CV ECHO MEAS - IVS/LVPW: 0.7 CM
BH CV ECHO MEAS - IVSD: 0.7 CM
BH CV ECHO MEAS - LA DIMENSION: 2.7 CM
BH CV ECHO MEAS - LAT PEAK E' VEL: 11.4 CM/SEC
BH CV ECHO MEAS - LV DIASTOLIC VOL/BSA (35-75): 35.8 CM2
BH CV ECHO MEAS - LV MASS(C)D: 105.6 GRAMS
BH CV ECHO MEAS - LV SYSTOLIC VOL/BSA (12-30): 11.6 CM2
BH CV ECHO MEAS - LVIDD: 4.1 CM
BH CV ECHO MEAS - LVIDS: 2.9 CM
BH CV ECHO MEAS - LVOT AREA: 3.8 CM2
BH CV ECHO MEAS - LVOT DIAM: 2.2 CM
BH CV ECHO MEAS - LVPWD: 1 CM
BH CV ECHO MEAS - MED PEAK E' VEL: 10.2 CM/SEC
BH CV ECHO MEAS - MV A MAX VEL: 51 CM/SEC
BH CV ECHO MEAS - MV E MAX VEL: 63 CM/SEC
BH CV ECHO MEAS - MV E/A: 1.24
BH CV ECHO MEAS - PA ACC TIME: 0.11 SEC
BH CV ECHO MEAS - PA PR(ACCEL): 31.3 MMHG
BH CV ECHO MEAS - RAP SYSTOLE: 10 MMHG
BH CV ECHO MEAS - RVSP: 22.7 MMHG
BH CV ECHO MEAS - SI(MOD-SP4): 24.2 ML/M2
BH CV ECHO MEAS - SV(MOD-SP4): 50.3 ML
BH CV ECHO MEAS - TAPSE (>1.6): 2.16 CM
BH CV ECHO MEAS - TR MAX PG: 12.7 MMHG
BH CV ECHO MEAS - TR MAX VEL: 178 CM/SEC
BH CV ECHO MEASUREMENTS AVERAGE E/E' RATIO: 5.83
BH CV NUCLEAR PRIOR STUDY: 3
BH CV REST NUCLEAR ISOTOPE DOSE: 10.5 MCI
BH CV STRESS BP STAGE 1: NORMAL
BH CV STRESS BP STAGE 2: NORMAL
BH CV STRESS DURATION MIN STAGE 1: 3
BH CV STRESS DURATION MIN STAGE 2: 3
BH CV STRESS DURATION MIN STAGE 3: 1
BH CV STRESS DURATION SEC STAGE 1: 0
BH CV STRESS DURATION SEC STAGE 2: 0
BH CV STRESS DURATION SEC STAGE 3: 30
BH CV STRESS GRADE STAGE 1: 10
BH CV STRESS GRADE STAGE 2: 12
BH CV STRESS GRADE STAGE 3: 14
BH CV STRESS HR STAGE 1: 108
BH CV STRESS HR STAGE 2: 157
BH CV STRESS HR STAGE 3: 187
BH CV STRESS METS STAGE 1: 5
BH CV STRESS METS STAGE 2: 7.5
BH CV STRESS METS STAGE 3: 10
BH CV STRESS NUCLEAR ISOTOPE DOSE: 31 MCI
BH CV STRESS PROTOCOL 1: NORMAL
BH CV STRESS RECOVERY BP: NORMAL MMHG
BH CV STRESS RECOVERY HR: 98 BPM
BH CV STRESS SPEED STAGE 1: 1.7
BH CV STRESS SPEED STAGE 2: 2.5
BH CV STRESS SPEED STAGE 3: 3.4
BH CV STRESS STAGE 1: 1
BH CV STRESS STAGE 2: 2
BH CV STRESS STAGE 3: 3
LV EF NUC BP: 70 %
MAXIMAL PREDICTED HEART RATE: 192 BPM
MAXIMAL PREDICTED HEART RATE: 192 BPM
PERCENT MAX PREDICTED HR: 97.4 %
STRESS BASELINE BP: NORMAL MMHG
STRESS BASELINE HR: 94 BPM
STRESS PERCENT HR: 115 %
STRESS POST ESTIMATED WORKLOAD: 10.1 METS
STRESS POST EXERCISE DUR MIN: 7 MIN
STRESS POST EXERCISE DUR SEC: 30 SEC
STRESS POST PEAK BP: NORMAL MMHG
STRESS POST PEAK HR: 187 BPM
STRESS TARGET HR: 163 BPM
STRESS TARGET HR: 163 BPM

## 2023-01-31 PROCEDURE — 93017 CV STRESS TEST TRACING ONLY: CPT

## 2023-01-31 PROCEDURE — A9500 TC99M SESTAMIBI: HCPCS | Performed by: INTERNAL MEDICINE

## 2023-01-31 PROCEDURE — 93018 CV STRESS TEST I&R ONLY: CPT | Performed by: INTERNAL MEDICINE

## 2023-01-31 PROCEDURE — 93306 TTE W/DOPPLER COMPLETE: CPT | Performed by: INTERNAL MEDICINE

## 2023-01-31 PROCEDURE — 78452 HT MUSCLE IMAGE SPECT MULT: CPT | Performed by: INTERNAL MEDICINE

## 2023-01-31 PROCEDURE — 0 TECHNETIUM SESTAMIBI: Performed by: INTERNAL MEDICINE

## 2023-01-31 PROCEDURE — 93306 TTE W/DOPPLER COMPLETE: CPT

## 2023-01-31 PROCEDURE — 78452 HT MUSCLE IMAGE SPECT MULT: CPT

## 2023-01-31 RX ORDER — METOPROLOL SUCCINATE 25 MG/1
25 TABLET, EXTENDED RELEASE ORAL DAILY
Qty: 90 TABLET | Refills: 3 | Status: SHIPPED | OUTPATIENT
Start: 2023-01-31 | End: 2023-03-14

## 2023-01-31 RX ADMIN — TECHNETIUM TC 99M SESTAMIBI 1 DOSE: 1 INJECTION INTRAVENOUS at 12:00

## 2023-01-31 RX ADMIN — TECHNETIUM TC 99M SESTAMIBI 1 DOSE: 1 INJECTION INTRAVENOUS at 10:27

## 2023-01-31 NOTE — TELEPHONE ENCOUNTER
CALLED THE PATIENT AND GAVE HIM THE NEW MED INSTRUCTIONS PER DR. MULLEN. TO STOP THE IMDUR, AND TO START THE METOPROLOL SUCCINATE 25MG qd, sent the prescription to the patients pharmacy.

## 2023-02-01 ENCOUNTER — DOCUMENTATION (OUTPATIENT)
Dept: CARDIOLOGY | Facility: CLINIC | Age: 28
End: 2023-02-01
Payer: COMMERCIAL

## 2023-02-01 DIAGNOSIS — R94.39 ABNORMAL STRESS TEST: Primary | ICD-10-CM

## 2023-02-01 RX ORDER — OMEPRAZOLE 40 MG/1
40 CAPSULE, DELAYED RELEASE ORAL DAILY
Qty: 90 CAPSULE | Refills: 3 | Status: SHIPPED | OUTPATIENT
Start: 2023-02-01

## 2023-02-08 PROBLEM — J34.2 DNS (DEVIATED NASAL SEPTUM): Status: ACTIVE | Noted: 2023-02-08

## 2023-02-08 PROBLEM — J34.89 NASAL VALVE STENOSIS: Status: ACTIVE | Noted: 2023-02-08

## 2023-02-08 PROBLEM — J34.3 HYPERTROPHY OF NASAL TURBINATES: Status: ACTIVE | Noted: 2023-02-08

## 2023-02-09 PROBLEM — R94.39 ABNORMAL STRESS TEST: Status: ACTIVE | Noted: 2023-02-09

## 2023-02-13 DIAGNOSIS — R94.39 ABNORMAL STRESS TEST: Primary | ICD-10-CM

## 2023-02-28 ENCOUNTER — TREATMENT (OUTPATIENT)
Dept: CARDIOLOGY | Facility: CLINIC | Age: 28
End: 2023-02-28
Payer: COMMERCIAL

## 2023-02-28 DIAGNOSIS — R07.89 OTHER CHEST PAIN: ICD-10-CM

## 2023-02-28 DIAGNOSIS — R00.2 PALPITATIONS: ICD-10-CM

## 2023-02-28 PROCEDURE — 93272 ECG/REVIEW INTERPRET ONLY: CPT | Performed by: INTERNAL MEDICINE

## 2023-03-07 ENCOUNTER — HOSPITAL ENCOUNTER (OUTPATIENT)
Dept: CT IMAGING | Facility: HOSPITAL | Age: 28
Discharge: HOME | End: 2023-03-07
Admitting: INTERNAL MEDICINE
Payer: COMMERCIAL

## 2023-03-07 VITALS
DIASTOLIC BLOOD PRESSURE: 73 MMHG | RESPIRATION RATE: 16 BRPM | OXYGEN SATURATION: 98 % | SYSTOLIC BLOOD PRESSURE: 140 MMHG | HEART RATE: 66 BPM

## 2023-03-07 DIAGNOSIS — R94.39 ABNORMAL STRESS TEST: ICD-10-CM

## 2023-03-07 PROCEDURE — 75574 CT ANGIO HRT W/3D IMAGE: CPT | Performed by: RADIOLOGY

## 2023-03-07 PROCEDURE — 25510000001 IOPAMIDOL PER 1 ML: Performed by: INTERNAL MEDICINE

## 2023-03-07 PROCEDURE — 75574 CT ANGIO HRT W/3D IMAGE: CPT

## 2023-03-07 RX ORDER — LIDOCAINE HYDROCHLORIDE 10 MG/ML
5 INJECTION, SOLUTION EPIDURAL; INFILTRATION; INTRACAUDAL; PERINEURAL AS NEEDED
OUTPATIENT
Start: 2023-03-07

## 2023-03-07 RX ORDER — NITROGLYCERIN 0.4 MG/1
TABLET SUBLINGUAL AS NEEDED
Status: COMPLETED | OUTPATIENT
Start: 2023-03-07 | End: 2023-03-07

## 2023-03-07 RX ORDER — METOPROLOL TARTRATE 100 MG/1
100 TABLET ORAL ONCE AS NEEDED
OUTPATIENT
Start: 2023-03-07

## 2023-03-07 RX ORDER — SODIUM CHLORIDE 0.9 % (FLUSH) 0.9 %
3 SYRINGE (ML) INJECTION EVERY 12 HOURS SCHEDULED
OUTPATIENT
Start: 2023-03-07

## 2023-03-07 RX ORDER — NITROGLYCERIN 0.4 MG/1
0.4 TABLET SUBLINGUAL
OUTPATIENT
Start: 2023-03-07 | End: 2023-03-07

## 2023-03-07 RX ORDER — METOPROLOL TARTRATE 50 MG/1
50 TABLET, FILM COATED ORAL ONCE AS NEEDED
OUTPATIENT
Start: 2023-03-07

## 2023-03-07 RX ORDER — SODIUM CHLORIDE 0.9 % (FLUSH) 0.9 %
10 SYRINGE (ML) INJECTION AS NEEDED
OUTPATIENT
Start: 2023-03-07

## 2023-03-07 RX ADMIN — IOPAMIDOL 88 ML: 755 INJECTION, SOLUTION INTRAVENOUS at 12:32

## 2023-03-07 RX ADMIN — NITROGLYCERIN 0.4 MG: 0.4 TABLET SUBLINGUAL at 12:35

## 2023-03-07 NOTE — NURSING NOTE
Procedure completed. Patient tolerated well, denies needs or complaints at this time. Will continue to monitor patient  post procedure.

## 2023-03-14 ENCOUNTER — OFFICE VISIT (OUTPATIENT)
Dept: CARDIOLOGY | Facility: CLINIC | Age: 28
End: 2023-03-14
Payer: COMMERCIAL

## 2023-03-14 VITALS
DIASTOLIC BLOOD PRESSURE: 82 MMHG | WEIGHT: 200.4 LBS | HEIGHT: 70 IN | BODY MASS INDEX: 28.69 KG/M2 | OXYGEN SATURATION: 100 % | SYSTOLIC BLOOD PRESSURE: 128 MMHG | HEART RATE: 71 BPM

## 2023-03-14 DIAGNOSIS — I47.1 PAROXYSMAL SVT (SUPRAVENTRICULAR TACHYCARDIA): Primary | ICD-10-CM

## 2023-03-14 DIAGNOSIS — R07.89 OTHER CHEST PAIN: ICD-10-CM

## 2023-03-14 DIAGNOSIS — R00.2 PALPITATIONS: Primary | ICD-10-CM

## 2023-03-14 PROCEDURE — 99214 OFFICE O/P EST MOD 30 MIN: CPT | Performed by: INTERNAL MEDICINE

## 2023-03-14 RX ORDER — METOPROLOL SUCCINATE 25 MG/1
37.5 TABLET, EXTENDED RELEASE ORAL DAILY
Qty: 90 TABLET | Refills: 3 | Status: SHIPPED | OUTPATIENT
Start: 2023-03-14

## 2023-03-14 RX ORDER — FLUOXETINE HYDROCHLORIDE 20 MG/1
20 CAPSULE ORAL DAILY
COMMUNITY

## 2023-03-14 NOTE — PROGRESS NOTES
Office Note    Subjective     Jan Chavez is a 27 y.o. male who presents to day for elevation of palpitation      Active Problems:  Problem List Items Addressed This Visit        Cardiac and Vasculature    Palpitations - Primary   Other Visit Diagnoses     Other chest pain              HPI  Patient was initially seen on [facility] 17 2023.  Patient has been having some heart rates going up on him even with minimal exertion.  Patient wore event recorder and his heart rate was fluctuating with a low of 50s to as high as 170 range.  Patient had another episode of fast heart rate, on yesterday with heart rate going in 160s.  Patient usually is not symptomatic from that.  When he gets heart rate go into this range sometime he feels jittery.  No blackouts.  Patient had a ischemic evaluation done with a echocardiogram done on [facility] 31st which showed normal EF.  Patient had a CTA coronaries done on March 7, 2023 which did not show any significant epicardial coronary disease.    PRIOR MEDS  Current Outpatient Medications on File Prior to Visit   Medication Sig Dispense Refill   • FLUoxetine (PROzac) 20 MG capsule Take 1 capsule by mouth Daily.     • omeprazole (priLOSEC) 40 MG capsule Take 1 capsule by mouth Daily. 90 capsule 3   • [DISCONTINUED] metoprolol succinate XL (TOPROL-XL) 25 MG 24 hr tablet Take 1 tablet by mouth Daily. 90 tablet 3     No current facility-administered medications on file prior to visit.       ALLERGIES  Patient has no known allergies.    HISTORY  History reviewed. No pertinent past medical history.    Social History     Socioeconomic History   • Marital status: Single   Tobacco Use   • Smoking status: Never     Passive exposure: Never   • Smokeless tobacco: Current     Types: Snuff   Vaping Use   • Vaping Use: Never used   Substance and Sexual Activity   • Alcohol use: Never   • Drug use: Never   • Sexual activity: Defer       Family History   Problem Relation Age of Onset   • Hypertension  "Father    • Heart failure Paternal Grandfather    • Heart attack Paternal Grandfather        Review of Systems   Constitutional: Negative for activity change, appetite change, chills, diaphoresis, fatigue, fever and unexpected weight change.   HENT: Negative for congestion, ear discharge, ear pain, hearing loss, nosebleeds, sore throat and tinnitus.    Eyes: Negative for redness and visual disturbance.   Respiratory: Positive for shortness of breath. Negative for apnea, cough, choking, chest tightness, wheezing and stridor.    Cardiovascular: Positive for palpitations. Negative for leg swelling.   Gastrointestinal: Negative for abdominal distention, abdominal pain, constipation, diarrhea, nausea and vomiting.   Endocrine: Negative for cold intolerance, heat intolerance, polydipsia, polyphagia and polyuria.   Genitourinary: Negative for difficulty urinating, flank pain, frequency, hematuria and urgency.   Musculoskeletal: Negative for arthralgias, back pain, gait problem, joint swelling, myalgias and neck pain.   Skin: Negative for pallor and rash.   Neurological: Negative for dizziness, tremors, seizures, syncope, weakness, light-headedness and headaches.   Hematological: Does not bruise/bleed easily.   Psychiatric/Behavioral: Negative for agitation, hallucinations, self-injury, sleep disturbance and suicidal ideas. The patient is not nervous/anxious.        Objective     VITALS: /82   Pulse 71   Ht 177.8 cm (70\")   Wt 90.9 kg (200 lb 6.4 oz)   SpO2 100%   BMI 28.75 kg/m²     LABS:   Hospital Outpatient Visit on 01/31/2023   Component Date Value Ref Range Status   • Target HR (85%) 01/31/2023 163  bpm Final   • Max. Pred. HR (100%) 01/31/2023 192  bpm Final   • LVIDd 01/31/2023 4.1  cm Final   • LVIDs 01/31/2023 2.9  cm Final   • IVSd 01/31/2023 0.70  cm Final   • LVPWd 01/31/2023 1.00  cm Final   • FS 01/31/2023 29.3  % Final   • IVS/LVPW 01/31/2023 0.70  cm Final   • ESV(cubed) 01/31/2023 24.4  ml Final "   • LV Sys Vol (BSA corrected) 01/31/2023 11.6  cm2 Final   • EDV(cubed) 01/31/2023 68.9  ml Final   • LV Carter Vol (BSA corrected) 01/31/2023 35.8  cm2 Final   • LVOT area 01/31/2023 3.8  cm2 Final   • LV mass(C)d 01/31/2023 105.6  grams Final   • LVOT diam 01/31/2023 2.20  cm Final   • EDV(MOD-sp4) 01/31/2023 74.4  ml Final   • ESV(MOD-sp4) 01/31/2023 24.1  ml Final   • SV(MOD-sp4) 01/31/2023 50.3  ml Final   • SI(MOD-sp4) 01/31/2023 24.2  ml/m2 Final   • EF(MOD-sp4) 01/31/2023 67.6  % Final   • MV E max isrrael 01/31/2023 63.0  cm/sec Final   • MV A max isrrael 01/31/2023 51.0  cm/sec Final   • MV E/A 01/31/2023 1.24   Final   • Med Peak E' Isrrael 01/31/2023 10.2  cm/sec Final   • Lat Peak E' Isrrael 01/31/2023 11.4  cm/sec Final   • Avg E/e' ratio 01/31/2023 5.83   Final   • TAPSE (>1.6) 01/31/2023 2.16  cm Final   • LA dimension (2D)  01/31/2023 2.7  cm Final   • Ao pk isrrael 01/31/2023 99.0  cm/sec Final   • Ao max PG 01/31/2023 3.9  mmHg Final   • Ao mean PG 01/31/2023 2.00  mmHg Final   • Ao V2 VTI 01/31/2023 19.5  cm Final   • TR max isrrael 01/31/2023 178.0  cm/sec Final   • TR max PG 01/31/2023 12.7  mmHg Final   • RVSP(TR) 01/31/2023 22.7  mmHg Final   • RAP systole 01/31/2023 10.0  mmHg Final   • PA acc time 01/31/2023 0.11  sec Final   • PA pr(Accel) 01/31/2023 31.3  mmHg Final   • Ao root diam 01/31/2023 3.0  cm Final   • ACS 01/31/2023 1.90  cm Final   • EF(MOD-bp) 01/31/2023 67.0  % Final   Hospital Outpatient Visit on 01/31/2023   Component Date Value Ref Range Status   • Target HR (85%) 01/31/2023 163  bpm Final   • Max. Pred. HR (100%) 01/31/2023 192  bpm Final   • Nuclear Prior Study 01/31/2023 3.0   Final   • BH CV REST NUCLEAR ISOTOPE DOSE 01/31/2023 10.5  mCi Final   • Exercise duration (min) 01/31/2023 7  min Final   • Exercise duration (sec) 01/31/2023 30  sec Final   • Estimated workload 01/31/2023 10.1  METS Final   • BH CV STRESS NUCLEAR ISOTOPE DOSE 01/31/2023 31.0  mCi Final   •  CV STRESS PROTOCOL 1  01/31/2023 Pascual   Final   • Stage 1 01/31/2023 1   Final   • HR Stage 1 01/31/2023 108   Final   • BP Stage 1 01/31/2023 140/74   Final   • Duration Min Stage 1 01/31/2023 3   Final   • Duration Sec Stage 1 01/31/2023 0   Final   • Grade Stage 1 01/31/2023 10   Final   • Speed Stage 1 01/31/2023 1.7   Final   • BH CV STRESS METS STAGE 1 01/31/2023 5   Final   • Stage 2 01/31/2023 2   Final   • HR Stage 2 01/31/2023 157   Final   • BP Stage 2 01/31/2023 153/65   Final   • Duration Min Stage 2 01/31/2023 3   Final   • Duration Sec Stage 2 01/31/2023 0   Final   • Grade Stage 2 01/31/2023 12   Final   • Speed Stage 2 01/31/2023 2.5   Final   • BH CV STRESS METS STAGE 2 01/31/2023 7.5   Final   • Stage 3 01/31/2023 3   Final   • HR Stage 3 01/31/2023 187   Final   • Duration Min Stage 3 01/31/2023 1   Final   • Duration Sec Stage 3 01/31/2023 30   Final   • Grade Stage 3 01/31/2023 14   Final   • Speed Stage 3 01/31/2023 3.4   Final   • BH CV STRESS METS STAGE 3 01/31/2023 10.0   Final   • Baseline HR 01/31/2023 94  bpm Final   • Baseline BP 01/31/2023 128/73  mmHg Final   • Peak HR 01/31/2023 187  bpm Final   • Percent Max Pred HR 01/31/2023 97.40  % Final   • Percent Target HR 01/31/2023 115  % Final   • Peak BP 01/31/2023 153/65  mmHg Final   • Recovery HR 01/31/2023 98  bpm Final   • Recovery BP 01/31/2023 146/57  mmHg Final   • Nuc Stress EF 01/31/2023 70  % Final   Lab on 01/17/2023   Component Date Value Ref Range Status   • TSH 01/17/2023 0.338  0.270 - 4.200 uIU/mL Final   • T Uptake 01/17/2023 1.00  0.80 - 1.30 TBI Final   • T4, Total 01/17/2023 7.12  4.50 - 11.70 mcg/dL Final    T4 results may be falsely increased if patient taking Biotin.   Admission on 12/22/2022, Discharged on 12/23/2022   Component Date Value Ref Range Status   • QT Interval 12/22/2022 302  ms Final   • QTC Interval 12/22/2022 416  ms Final   • Extra Tube 12/23/2022 Hold for add-ons.   Final    Auto resulted.   • Extra Tube 12/23/2022  hold for add-on   Final    Auto resulted   • Extra Tube 12/23/2022 Hold for add-ons.   Final    Auto resulted.   • Extra Tube 12/23/2022 Hold for add-ons.   Final    Auto resulted.   • Extra Tube 12/23/2022 Hold for add-ons.   Final    Auto resulted   • QT Interval 12/23/2022 322  ms Final   • QTC Interval 12/23/2022 413  ms Final   • Glucose 12/23/2022 162 (H)  65 - 99 mg/dL Final   • BUN 12/23/2022 17  6 - 20 mg/dL Final   • Creatinine 12/23/2022 1.07  0.76 - 1.27 mg/dL Final   • Sodium 12/23/2022 141  136 - 145 mmol/L Final   • Potassium 12/23/2022 4.0  3.5 - 5.2 mmol/L Final    Slight hemolysis detected by analyzer. Results may be affected.   • Chloride 12/23/2022 104  98 - 107 mmol/L Final   • CO2 12/23/2022 27.0  22.0 - 29.0 mmol/L Final   • Calcium 12/23/2022 9.6  8.6 - 10.5 mg/dL Final   • Total Protein 12/23/2022 7.6  6.0 - 8.5 g/dL Final   • Albumin 12/23/2022 4.70  3.50 - 5.20 g/dL Final   • ALT (SGPT) 12/23/2022 71 (H)  1 - 41 U/L Final   • AST (SGOT) 12/23/2022 40  1 - 40 U/L Final   • Alkaline Phosphatase 12/23/2022 64  39 - 117 U/L Final   • Total Bilirubin 12/23/2022 0.3  0.0 - 1.2 mg/dL Final   • Globulin 12/23/2022 2.9  gm/dL Final    Calculated Result   • A/G Ratio 12/23/2022 1.6  g/dL Final   • BUN/Creatinine Ratio 12/23/2022 15.9  7.0 - 25.0 Final   • Anion Gap 12/23/2022 10.0  5.0 - 15.0 mmol/L Final   • eGFR 12/23/2022 96.9  >60.0 mL/min/1.73 Final    National Kidney Foundation and American Society of Nephrology (ASN) Task Force recommended calculation based on the Chronic Kidney Disease Epidemiology Collaboration (CKD-EPI) equation refit without adjustment for race.   • Troponin T 12/23/2022 <0.010  0.000 - 0.030 ng/mL Final   • WBC 12/23/2022 9.33  3.40 - 10.80 10*3/mm3 Final   • RBC 12/23/2022 5.16  4.14 - 5.80 10*6/mm3 Final   • Hemoglobin 12/23/2022 15.3  13.0 - 17.7 g/dL Final   • Hematocrit 12/23/2022 45.1  37.5 - 51.0 % Final   • MCV 12/23/2022 87.4  79.0 - 97.0 fL Final   • MCH  12/23/2022 29.7  26.6 - 33.0 pg Final   • MCHC 12/23/2022 33.9  31.5 - 35.7 g/dL Final   • RDW 12/23/2022 13.2  12.3 - 15.4 % Final   • RDW-SD 12/23/2022 42.1  37.0 - 54.0 fl Final   • MPV 12/23/2022 10.7  6.0 - 12.0 fL Final   • Platelets 12/23/2022 276  140 - 450 10*3/mm3 Final   • Neutrophil % 12/23/2022 64.8  42.7 - 76.0 % Final   • Lymphocyte % 12/23/2022 23.0  19.6 - 45.3 % Final   • Monocyte % 12/23/2022 7.9  5.0 - 12.0 % Final   • Eosinophil % 12/23/2022 2.1  0.3 - 6.2 % Final   • Basophil % 12/23/2022 1.1  0.0 - 1.5 % Final   • Immature Grans % 12/23/2022 1.1 (H)  0.0 - 0.5 % Final   • Neutrophils, Absolute 12/23/2022 6.04  1.70 - 7.00 10*3/mm3 Final   • Lymphocytes, Absolute 12/23/2022 2.15  0.70 - 3.10 10*3/mm3 Final   • Monocytes, Absolute 12/23/2022 0.74  0.10 - 0.90 10*3/mm3 Final   • Eosinophils, Absolute 12/23/2022 0.20  0.00 - 0.40 10*3/mm3 Final   • Basophils, Absolute 12/23/2022 0.10  0.00 - 0.20 10*3/mm3 Final   • Immature Grans, Absolute 12/23/2022 0.10 (H)  0.00 - 0.05 10*3/mm3 Final   • nRBC 12/23/2022 0.0  0.0 - 0.2 /100 WBC Final   • Magnesium 12/23/2022 2.0  1.6 - 2.6 mg/dL Final   • TSH 12/23/2022 1.100  0.270 - 4.200 uIU/mL Final         IMAGING:   XR Chest 1 View    Result Date: 12/23/2022  1.  No consolidation or pleural effusion 2.  Heart size is normal. 3.  No acute bony findings. 4.  Incidental azygous lobe. Electronically signed by:  Dora Mathews M.D.  12/22/2022 11:21 PM [place]    CT Angiogram Chest Pulmonary Embolism    Result Date: 12/23/2022  1.  No pulmonary embolus. 2.  Unremarkable aorta Electronically signed by:  Dora Mathews M.D.  12/22/2022 11:21 PM [place]    CT Angiogram Coronary    Result Date: 3/8/2023    No acute findings in the coronary arteries.  This report was finalized on 3/8/2023 9:07 AM by Dr. Obed Manzano MD.      Results for orders placed during the hospital encounter of 01/31/23    Stress Test With Myocardial Perfusion One  Day    Interpretation Summary  •  Left ventricular ejection fraction is normal (Calculated EF = 70%).  •  Low risk for ischemic heart disease.  •  Myocardial perfusion imaging indicates a small-sized, mildly severe area of ischemia located in the anterior wall and lateral wall.  •  Impressions are consistent with an intermediate risk study.  •  TID 0.96.  •  Findings consistent with a normal ECG stress test.     No results found for this or any previous visit.          EXAM:  Constitutional:       General: Awake.      Appearance: Healthy appearance. Not in distress.   Eyes:      Conjunctiva/sclera: Conjunctivae normal.   HENT:      Head: Normocephalic and atraumatic.      Nose: Nose normal.    Mouth/Throat:      Pharynx: Oropharynx is clear.   Neck:      Thyroid: Thyroid normal.      Vascular: No carotid bruit or JVD.   Pulmonary:      Effort: Pulmonary effort is normal.      Breath sounds: Normal breath sounds.   Chest:      Chest wall: Not tender to palpatation.   Cardiovascular:      PMI at left midclavicular line. Normal rate. Regular rhythm. Normal S1 with normal intensity. Normal S2 with normal intensity.      Murmurs: There is no murmur.      No gallop. No rub.   Pulses:     Intact distal pulses.   Edema:     Peripheral edema absent.   Abdominal:      General: Bowel sounds are normal. There is no distension.      Palpations: Abdomen is soft. There is no hepatomegaly.      Tenderness: There is no abdominal tenderness.   Musculoskeletal: Normal range of motion.      Cervical back: Normal range of motion. Skin:     General: Skin is warm and dry. There is no cyanosis.      Coloration: Skin is not jaundiced.   Neurological:      Mental Status: Alert and oriented to person, place and time.      Motor: Motor function is intact.      Gait: Gait is intact.   Psychiatric:         Attention and Perception: Attention and perception normal.         Speech: Speech normal.         Behavior: Behavior is cooperative.          Cognition and Memory: Cognition and memory normal.         Judgment: Judgment normal.          Procedure   Procedures       Assessment & Plan     Diagnoses and all orders for this visit:    1. Palpitations (Primary)    2. Other chest pain    Other orders  -     metoprolol succinate XL (TOPROL-XL) 25 MG 24 hr tablet; Take 1.5 tablets by mouth Daily.  Dispense: 90 tablet; Refill: 3          PLAN  #1 cardiac.  Patient with episodes of palpitations with heart rate going in 160s 170s unprovoked.  Patient wore event recorder which showed supraventricular tachycardia only.  Patient is on beta-blockers.  Will go up on the metoprolol succinate to 37.5 daily.  May consider sending him for EP evaluation also to see whether it is a rhythm that needs to have ablation done for.  Patient had negative CTA coronaries done on March 7.  Echo on January 31, 2023 showed normal EF.  Patient had normal TSH in December 2022.  We will watch him closely and clinically.  Patient was advised to take an extra dose of metoprolol if his heart rate speeds up in 160s 170s range.  Referred to EP in [facility] ORDERED DURING VISIT:    Medications Discontinued During This Encounter   Medication Reason   • metoprolol succinate XL (TOPROL-XL) 25 MG 24 hr tablet Historical Med - Therapy completed        New Medications Ordered This Visit   Medications   • metoprolol succinate XL (TOPROL-XL) 25 MG 24 hr tablet     Sig: Take 1.5 tablets by mouth Daily.     Dispense:  90 tablet     Refill:  3         Follow Up:   Return in about 6 months (around 9/14/2023) for Recheck.    Patient was given instructions and counseling regarding his condition or for health maintenance advice. Please see specific information pulled into the AVS if appropriate.   As always, Meg Leo, APRN  I appreciate very much the opportunity to participate in the cardiovascular care of your patients. Please do not hesitate to call me with any questions with regards  to Jan Chavez evaluation and management.         This document has been electronically signed by Vadim Vivas MD [facility], Interventional Cardiology  March 14, 2023 13:46 EDT    Dictated Utilizing Dragon Dictation: Part of this note may be an electronic transcription/translation of spoken language to printed text using the Dragon Dictation System.

## 2023-04-04 NOTE — H&P
Chief complaint: Nasal obstruction; snoring; can't breath well  HPI: Sleep study ordered; snores all the time/ mouth breather; AST results - Spring 4/2022; wanted to discuss scheduling surgery; increased difficulty breathing through nose; always obstructed, one or other; not resolved      Review of Systems:    negative    History  No past medical history on file.  No past surgical history on file.  Family History   Problem Relation Age of Onset   • Hypertension Father    • Heart failure Paternal Grandfather    • Heart attack Paternal Grandfather      Social History     Tobacco Use   • Smoking status: Never     Passive exposure: Never   • Smokeless tobacco: Current     Types: Snuff   Vaping Use   • Vaping Use: Never used   Substance Use Topics   • Alcohol use: Never   • Drug use: Never     No medications prior to admission.     Allergies:  Patient has no known allergies.    Objective     Vital Signs  B/P 129/83  Temp: 97.6  WT: 197.2    Physical Exam:      General Appearance:    Alert, cooperative, in no acute distress   Head:    Normocephalic, without obvious abnormality, atraumatic   Eyes:            Lids and lashes normal, conjunctivae and sclerae normal, no   icterus, no pallor, corneas clear, PERRLA   Ears:    Ears appear intact with no abnormalities noted   Nose:        Throat:   Nasal septum into wall L; + Burnett maneuver with side wall collapse; very large nasal turbs        No oral lesions, no thrush, oral mucosa moist   Neck:   No adenopathy, supple, trachea midline, no thyromegaly, no   carotid bruit, no JVD; Thyroid- WNL         Lungs:     Clear to auscultation,respirations regular, even and                  unlabored    Heart:    Regular rhythm and normal rate, normal S1 and S2, no            murmur, no gallop, no rub, no click       Cranial Nerves:   1-12 WNL                                                    Assessment  DNS  VESTIBULAR STENOSIS  TURB HYPERTROPHY  AR    Plan  SEPTOPLASTY, SUBMUCOUS  RESECTION OF INFERIOR TURBINATES BILATERAL/SEPTOPLASTY SUBMUCOSAL RESECTION OF INFERIOR TURBINATES WITH NASAL VALVE IMPLANT( WITH LATERAL CRURAL GRAFTS)             Brett Hill MD  04/04/23  16:51 EDT

## 2023-04-17 NOTE — H&P
Chief complaint: Nasal obstruction; snoring; can't breath well  HPI: Sleep study ordered; snores all the time/ mouth breather; AST results - Spring 4/2022; wanted to discuss scheduling surgery; increased difficulty breathing through nose; always obstructed, one or other; not resolved      Review of Systems:    negative    History  No past medical history on file.  No past surgical history on file.  Family History   Problem Relation Age of Onset   • Hypertension Father    • Heart failure Paternal Grandfather    • Heart attack Paternal Grandfather      Social History     Tobacco Use   • Smoking status: Never     Passive exposure: Never   • Smokeless tobacco: Current     Types: Snuff   Vaping Use   • Vaping Use: Never used   Substance Use Topics   • Alcohol use: Never   • Drug use: Never     No medications prior to admission.     Allergies:  Patient has no known allergies.    Objective     Vital Signs  B/P 129/83  Temp: 97.6  WT: 197.2    Physical Exam:      General Appearance:    Alert, cooperative, in no acute distress   Head:    Normocephalic, without obvious abnormality, atraumatic   Eyes:            Lids and lashes normal, conjunctivae and sclerae normal, no   icterus, no pallor, corneas clear, PERRLA   Ears:    Ears appear intact with no abnormalities noted   Nose:        Throat:   Nasal septum into wall L; + Nicholas maneuver with side wall collapse; very large nasal turbs        No oral lesions, no thrush, oral mucosa moist   Neck:   No adenopathy, supple, trachea midline, no thyromegaly, no   carotid bruit, no JVD; Thyroid- WNL         Lungs:     Clear to auscultation,respirations regular, even and                  unlabored    Heart:    Regular rhythm and normal rate, normal S1 and S2, no            murmur, no gallop, no rub, no click       Cranial Nerves:   1-12 WNL                                                    Assessment  DNS  VESTIBULAR STENOSIS  TURB HYPERTROPHY  AR    Plan  SEPTOPLASTY, SUBMUCOUS  RESECTION OF INFERIOR TURBINATES BILATERAL/SEPTOPLASTY SUBMUCOSAL RESECTION OF INFERIOR TURBINATES WITH NASAL VALVE IMPLANT( WITH LATERAL CRURAL GRAFTS)             Brett Hill MD  04/17/23  10:01 EDT

## 2023-04-28 ENCOUNTER — ANESTHESIA EVENT (OUTPATIENT)
Dept: PERIOP | Facility: HOSPITAL | Age: 28
End: 2023-04-28
Payer: COMMERCIAL

## 2023-05-03 ENCOUNTER — ANESTHESIA (OUTPATIENT)
Dept: PERIOP | Facility: HOSPITAL | Age: 28
End: 2023-05-03
Payer: COMMERCIAL

## 2023-05-18 NOTE — H&P
Chief complaint: Nasal obstruction; snoring; can't breath well  HPI: Sleep study ordered; snores all the time/ mouth breather; AST results - Spring 4/2022; wanted to discuss scheduling surgery; increased difficulty breathing through nose; always obstructed, one or other; not resolved      Review of Systems:    negative    History  No past medical history on file.  No past surgical history on file.  Family History   Problem Relation Age of Onset   • Hypertension Father    • Heart failure Paternal Grandfather    • Heart attack Paternal Grandfather      Social History     Tobacco Use   • Smoking status: Never     Passive exposure: Never   • Smokeless tobacco: Current     Types: Snuff   Vaping Use   • Vaping Use: Never used   Substance Use Topics   • Alcohol use: Never   • Drug use: Never     No medications prior to admission.     Allergies:  Patient has no known allergies.    Objective     Vital Signs  B/P 129/83  Temp: 97.6  WT: 197.2    Physical Exam:      General Appearance:    Alert, cooperative, in no acute distress   Head:    Normocephalic, without obvious abnormality, atraumatic   Eyes:            Lids and lashes normal, conjunctivae and sclerae normal, no   icterus, no pallor, corneas clear, PERRLA   Ears:    Ears appear intact with no abnormalities noted   Nose:        Throat:   Nasal septum into wall L; + [place] maneuver with side wall collapse; very large nasal turbs        No oral lesions, no thrush, oral mucosa moist   Neck:   No adenopathy, supple, trachea midline, no thyromegaly, no   carotid bruit, no JVD; Thyroid- WNL         Lungs:     Clear to auscultation,respirations regular, even and                  unlabored    Heart:    Regular rhythm and normal rate, normal S1 and S2, no            murmur, no gallop, no rub, no click       Cranial Nerves:   1-12 WNL                                                    Assessment  DNS  VESTIBULAR STENOSIS  TURB HYPERTROPHY  AR    Plan  SEPTOPLASTY, SUBMUCOUS  RESECTION OF INFERIOR TURBINATES BILATERAL/SEPTOPLASTY SUBMUCOSAL RESECTION OF INFERIOR TURBINATES WITH NASAL VALVE IMPLANT( WITH LATERAL CRURAL GRAFTS)             Brett Hill MD  05/18/23  14:32 EDT

## 2023-05-25 NOTE — PROGRESS NOTES
Electrophysiology Clinic Consult     Jan Chavez  1995  [unfilled]  [unfilled]    05/31/23    DATE OF ADMISSION: (Not on file)  [facility] CARDIOLOGY MAIN [facility], April, APRN  [address] / [place] [address]  Referring Provider: Vadim Vivas MD     CC: palpitations    Problem list:  1. Inappropriate sinus tachycardia   a. Symptoms of palpitations 7 months ago   b. 30 day Holter monitor 1/2023: Avg HR 75, min HR 45, max . Runs of SVT  c. Started Metoprolol 1/2023  d. Echo 1/31/2023 : normal EF 66-70%, no significant valve disease  e. Stress test 1/2023: LVEF 70%, low risk for ischemic heart disease, myocardial perfusion study indicates small-sized, mildly severe area of ischemia located in anterior wall and lateral wall, impressions consistent with intermediate risk study, normal EKG stress test  f. CTA 3/2023: no significant coronary disease  2. Deviated nasal septum   3. Nasal valve stenosis  4. Surgeries:   a. Hand surgery      History of Present Illness:   Jan Chavez is a 27-year-old male with above past medical history referred by Dr. Vivas for palpitations and tachycardia. Symptoms of palpitations that started 7 months ago. Episodes can last 10-30 minutes with heart rate 160-170 bpm. He presented to the ER 12/2022 due to an episode but was not given any medication due to resolving prior to medication administration. He also has symptoms of SOB, fatigue and chest discomfort. Episodes can start when he is laying down, not correlated with activity.  His episodes of chest pain occur without tachycardia as well.  He started metoprolol 1/2023 and tachycardic episodes have decreased but he still has chest discomfort. Dr. Vivas suggested a [facility] but insurance would not approve. He had a CTA 3/2023 that revealed no significant coronary disease. He is scheduled for a septoplasty for deviated septum and nasal valve stenosis in June and presents today also for  cardiac clearance.  He does admit to dipping on a daily basis.  He has stopped high energy drinks and caffeine due to large amounts of coffee ingestion and since 7 months ago.  Has been treated with omeprazole for significant reflux disease which has improved in the past 6 months.  Caffeine: 1 soda, stopped coffee  Alcohol: rarely  Stimulant: none  Nicotine: dip every day, no smoking    No Known Allergies     Cannot display prior to admission medications because the patient has not been admitted in this contact.            Current Outpatient Medications:   •  FLUoxetine (PROzac) 20 MG capsule, Take 1 capsule by mouth Daily., Disp: , Rfl:   •  fluticasone (FLONASE) 50 MCG/ACT nasal spray, 2 sprays by Each Nare route Daily., Disp: , Rfl:   •  ipratropium (ATROVENT) 0.06 % nasal spray, ADMINISTER 2 SPRAYS IN EACH NOSTRIL THREE TIMES DAILY AS NEEDED FOR CONGESTION, Disp: , Rfl:   •  loratadine (CLARITIN) 10 MG tablet, TAKE 1 TABLET BY MOUTH AT AT BEDTIME, Disp: , Rfl:   •  metoprolol succinate XL (TOPROL-XL) 25 MG 24 hr tablet, Take 1.5 tablets by mouth Daily., Disp: 90 tablet, Rfl: 3  •  omeprazole (priLOSEC) 40 MG capsule, Take 1 capsule by mouth Daily., Disp: 90 capsule, Rfl: 3    Social History     Socioeconomic History   • Marital status: Single   Tobacco Use   • Smoking status: Former     Types: Cigarettes     Passive exposure: Never   • Smokeless tobacco: Current     Types: Snuff   Vaping Use   • Vaping Use: Never used   Substance and Sexual Activity   • Alcohol use: Never   • Drug use: Never   • Sexual activity: Defer       Family History   Problem Relation Age of Onset   • Hypertension Father    • Autoimmune disease Father    • Heart failure Paternal Grandfather    • Heart attack Paternal Grandfather    Father: tachycardia  Paternal grandfather: heart attack  Uncle: heart disease ( in 40s)     REVIEW OF SYSTEMS:   CONST:  No weight loss, fever, chills, weakness +fatigue.   HEENT:  No visual loss, blurred  "vision, double vision, yellow sclerae.                   No hearing loss, congestion, sore throat.   SKIN:      No rashes, urticaria, ulcers, sores.     RESP:     +shortness of breath, -hemoptysis, cough, sputum.   GI:           No anorexia, nausea, vomiting, diarrhea. No abdominal pain, melena.   :         No burning on urination, hematuria or increased frequency.  ENDO:    No diaphoresis, cold or heat intolerance. No polyuria or polydipsia.   NEURO:  No headache, +dizziness, -syncope, paralysis, ataxia, or parasthesias.                  No change in bowel or bladder control. No history of CVA/TIA  MUSC:    No muscle, back pain, joint pain or stiffness.   HEME:    No anemia, bleeding, bruising. No history of DVT/PE.  PSYCH:  No history of depression, anxiety    Vitals:    05/31/23 0927   BP: 116/78   BP Location: Right arm   Patient Position: Sitting   Pulse: 69   SpO2: 96%   Weight: 92.1 kg (203 lb)   Height: 177.8 cm (70\")                 Physical Exam:  GEN: Well nourished, well-developed, no acute distress  HEENT: Normocephalic, atraumatic, PERRLA, moist mucous membranes  NECK: Supple, NO JVD, no thyromegaly, no lymphadenopathy  CARD: Regular rate rhythm normal S1-S2.  No S3-S4 murmurs.  PMI normal.  No murmur   LUNGS: Clear to auscultation, normal respiratory effort  ABDOMEN: Soft, nontender, normal bowel sounds  EXTREMITIES: No gross deformities, no clubbing, cyanosis, or edema  SKIN: Warm, dry  NEURO: No focal deficits, alert and oriented x 3  PSYCHIATRIC: Normal affect and mood    MUSCULOSKELETAL: +chest discomfort with palpation     I personally viewed and interpreted the patient's EKG/Telemetry/lab data    Lab Results   Component Value Date    GLUCOSE 162 (H) 12/23/2022    CALCIUM 9.6 12/23/2022     12/23/2022    K 4.0 12/23/2022    CO2 27.0 12/23/2022     12/23/2022    BUN 17 12/23/2022    CREATININE 1.07 12/23/2022    BCR 15.9 12/23/2022    ANIONGAP 10.0 12/23/2022     Lab Results "   Component Value Date    WBC 9.33 12/23/2022    HGB 15.3 12/23/2022    HCT 45.1 12/23/2022    MCV 87.4 12/23/2022     12/23/2022     No results found for: INR, PROTIME  Lab Results   Component Value Date    TSH 0.338 01/17/2023    G9BLQGC 7.12 01/17/2023               ECG 12 Lead    Date/Time: 5/31/2023 10:23 AM  Performed by: Yonathan Beth MD  Authorized by: Yonathan Beth MD   Comparison: compared with previous ECG from 1/17/2023  Similar to previous ECG  Rhythm: sinus rhythm  Rate: normal  BPM: 69                ICD-10-CM ICD-9-CM   1. Inappropriate sinus tachycardia  R00.0 427.89   2. Chest pain, atypical  R07.89 786.59       Assessment and Plan:   1.  Narrow QRS tachycardia most likely consistent with inappropriate sinus tachycardia   -Episodes of palpitations, SOB, chest discomfort for 7 months. Describes gradual increase in heart rate during episodes and gradual decrease when episodes resolve.  -Holter monitor revealed episodes of sinus tachycardia; discussed possibility of STD SVT versus inappropriate sinus tachycardia.  On review of the event recorder, it appears to be more consistent with sinus tachycardia.  Discussed importance of exercise, weight loss, avoidance of caffeine and high energy drinks, etc.  -Will stop metoprolol and start Zebeta 7.5mg nightly     2. Atypical chest pain  -False positive stress test, CTA 3/2023: no significant coronary disease  -Started omeprozole with some improvement. Recommend GI referral to evaluate for GI work up related to chest pain; chest pain reproducible today on physical examination with palpation.  Most likely musculoskeletal in nature.  -May consider starting Mobic 15mg daily pending GI workup     3. Deviated septum   -Scheduled for Septoplasty. Will clear him for procedure and anesthesia.     Scribed for Yonathan Beth MD by VIJI Womack. 5/31/2023  10:10 EDT     I, Yonathan Beth MD, personally performed the services described in  this documentation as scribed by the above named individual in my presence, and it is both accurate and complete.  5/31/2023  10:32 EDT

## 2023-05-31 ENCOUNTER — TELEPHONE (OUTPATIENT)
Dept: CARDIOLOGY | Facility: CLINIC | Age: 28
End: 2023-05-31

## 2023-05-31 ENCOUNTER — OFFICE VISIT (OUTPATIENT)
Dept: CARDIOLOGY | Facility: CLINIC | Age: 28
End: 2023-05-31

## 2023-05-31 VITALS
SYSTOLIC BLOOD PRESSURE: 116 MMHG | HEIGHT: 70 IN | WEIGHT: 203 LBS | OXYGEN SATURATION: 96 % | BODY MASS INDEX: 29.06 KG/M2 | HEART RATE: 69 BPM | DIASTOLIC BLOOD PRESSURE: 78 MMHG

## 2023-05-31 DIAGNOSIS — R07.89 CHEST PAIN, ATYPICAL: Primary | ICD-10-CM

## 2023-05-31 DIAGNOSIS — R07.89 CHEST PAIN, ATYPICAL: ICD-10-CM

## 2023-05-31 DIAGNOSIS — R00.0 INAPPROPRIATE SINUS TACHYCARDIA: Primary | ICD-10-CM

## 2023-05-31 PROBLEM — I47.10 PAROXYSMAL SVT (SUPRAVENTRICULAR TACHYCARDIA): Status: ACTIVE | Noted: 2023-05-31

## 2023-05-31 PROBLEM — I47.1 PAROXYSMAL SVT (SUPRAVENTRICULAR TACHYCARDIA): Status: ACTIVE | Noted: 2023-05-31

## 2023-05-31 PROBLEM — I47.11 INAPPROPRIATE SINUS TACHYCARDIA: Status: ACTIVE | Noted: 2023-05-31

## 2023-05-31 RX ORDER — LORATADINE 10 MG/1
TABLET ORAL
COMMUNITY
Start: 2023-05-24

## 2023-05-31 RX ORDER — FLUTICASONE PROPIONATE 50 MCG
2 SPRAY, SUSPENSION (ML) NASAL DAILY
COMMUNITY
Start: 2023-05-24

## 2023-05-31 RX ORDER — IPRATROPIUM BROMIDE 42 UG/1
SPRAY, METERED NASAL
COMMUNITY
Start: 2023-05-24

## 2023-05-31 RX ORDER — BISOPROLOL FUMARATE 5 MG/1
7.5 TABLET, FILM COATED ORAL
Qty: 45 TABLET | Refills: 5 | Status: SHIPPED | OUTPATIENT
Start: 2023-05-31

## 2023-05-31 NOTE — TELEPHONE ENCOUNTER
Regina with [facility] is requesting clearance for the patient to have a septoplasty.     He was seen in clinic today and the clearance is in his office note. I tried to call Regina back, but she did not answer. I left a message for he to call me back at the office.

## 2023-06-12 ENCOUNTER — HOSPITAL ENCOUNTER (OUTPATIENT)
Facility: HOSPITAL | Age: 28
Setting detail: HOSPITAL OUTPATIENT SURGERY
Discharge: HOME | End: 2023-06-12
Attending: OTOLARYNGOLOGY | Admitting: OTOLARYNGOLOGY
Payer: COMMERCIAL

## 2023-06-12 VITALS
TEMPERATURE: 97.8 F | RESPIRATION RATE: 16 BRPM | DIASTOLIC BLOOD PRESSURE: 82 MMHG | SYSTOLIC BLOOD PRESSURE: 132 MMHG | HEART RATE: 72 BPM | OXYGEN SATURATION: 95 % | WEIGHT: 205 LBS | BODY MASS INDEX: 29.35 KG/M2 | HEIGHT: 70 IN

## 2023-06-12 DIAGNOSIS — J34.2 DNS (DEVIATED NASAL SEPTUM): Primary | ICD-10-CM

## 2023-06-12 LAB
ANION GAP SERPL CALCULATED.3IONS-SCNC: 9.5 MMOL/L (ref 5–15)
BASOPHILS # BLD AUTO: 0.05 10*3/MM3 (ref 0–0.2)
BASOPHILS NFR BLD AUTO: 0.7 % (ref 0–1.5)
BUN SERPL-MCNC: 15 MG/DL (ref 6–20)
BUN/CREAT SERPL: 14.9 (ref 7–25)
CALCIUM SPEC-SCNC: 9.4 MG/DL (ref 8.6–10.5)
CHLORIDE SERPL-SCNC: 105 MMOL/L (ref 98–107)
CO2 SERPL-SCNC: 26.5 MMOL/L (ref 22–29)
CREAT SERPL-MCNC: 1.01 MG/DL (ref 0.76–1.27)
DEPRECATED RDW RBC AUTO: 44 FL (ref 37–54)
EGFRCR SERPLBLD CKD-EPI 2021: 103.9 ML/MIN/1.73
EOSINOPHIL # BLD AUTO: 0.13 10*3/MM3 (ref 0–0.4)
EOSINOPHIL NFR BLD AUTO: 1.8 % (ref 0.3–6.2)
ERYTHROCYTE [DISTWIDTH] IN BLOOD BY AUTOMATED COUNT: 13.3 % (ref 12.3–15.4)
GLUCOSE SERPL-MCNC: 109 MG/DL (ref 65–99)
HCT VFR BLD AUTO: 45.9 % (ref 37.5–51)
HGB BLD-MCNC: 15 G/DL (ref 13–17.7)
IMM GRANULOCYTES # BLD AUTO: 0.1 10*3/MM3 (ref 0–0.05)
IMM GRANULOCYTES NFR BLD AUTO: 1.4 % (ref 0–0.5)
LYMPHOCYTES # BLD AUTO: 1.65 10*3/MM3 (ref 0.7–3.1)
LYMPHOCYTES NFR BLD AUTO: 22.6 % (ref 19.6–45.3)
MCH RBC QN AUTO: 29.4 PG (ref 26.6–33)
MCHC RBC AUTO-ENTMCNC: 32.7 G/DL (ref 31.5–35.7)
MCV RBC AUTO: 89.8 FL (ref 79–97)
MONOCYTES # BLD AUTO: 0.51 10*3/MM3 (ref 0.1–0.9)
MONOCYTES NFR BLD AUTO: 7 % (ref 5–12)
NEUTROPHILS NFR BLD AUTO: 4.87 10*3/MM3 (ref 1.7–7)
NEUTROPHILS NFR BLD AUTO: 66.5 % (ref 42.7–76)
NRBC BLD AUTO-RTO: 0 /100 WBC (ref 0–0.2)
PLATELET # BLD AUTO: 244 10*3/MM3 (ref 140–450)
PMV BLD AUTO: 11 FL (ref 6–12)
POTASSIUM SERPL-SCNC: 4.1 MMOL/L (ref 3.5–5.2)
RBC # BLD AUTO: 5.11 10*6/MM3 (ref 4.14–5.8)
SODIUM SERPL-SCNC: 141 MMOL/L (ref 136–145)
WBC NRBC COR # BLD: 7.31 10*3/MM3 (ref 3.4–10.8)

## 2023-06-12 PROCEDURE — 0 MEPERIDINE PER 100 MG: Performed by: NURSE ANESTHETIST, CERTIFIED REGISTERED

## 2023-06-12 PROCEDURE — 25010000002 ONDANSETRON PER 1 MG: Performed by: NURSE ANESTHETIST, CERTIFIED REGISTERED

## 2023-06-12 PROCEDURE — 25010000002 FENTANYL CITRATE (PF) 50 MCG/ML SOLUTION: Performed by: NURSE ANESTHETIST, CERTIFIED REGISTERED

## 2023-06-12 PROCEDURE — 85025 COMPLETE CBC W/AUTO DIFF WBC: CPT | Performed by: OTOLARYNGOLOGY

## 2023-06-12 PROCEDURE — 80048 BASIC METABOLIC PNL TOTAL CA: CPT | Performed by: OTOLARYNGOLOGY

## 2023-06-12 PROCEDURE — 25010000002 MIDAZOLAM PER 1 MG: Performed by: NURSE ANESTHETIST, CERTIFIED REGISTERED

## 2023-06-12 PROCEDURE — 25010000002 KETOROLAC TROMETHAMINE PER 15 MG: Performed by: NURSE ANESTHETIST, CERTIFIED REGISTERED

## 2023-06-12 PROCEDURE — 25010000002 DEXAMETHASONE PER 1 MG: Performed by: NURSE ANESTHETIST, CERTIFIED REGISTERED

## 2023-06-12 PROCEDURE — 25010000002 NEOSTIGMINE 10 MG/10ML SOLUTION: Performed by: NURSE ANESTHETIST, CERTIFIED REGISTERED

## 2023-06-12 PROCEDURE — 25010000002 PROPOFOL 200 MG/20ML EMULSION: Performed by: NURSE ANESTHETIST, CERTIFIED REGISTERED

## 2023-06-12 RX ORDER — ONDANSETRON 2 MG/ML
INJECTION INTRAMUSCULAR; INTRAVENOUS AS NEEDED
Status: DISCONTINUED | OUTPATIENT
Start: 2023-06-12 | End: 2023-06-12 | Stop reason: SURG

## 2023-06-12 RX ORDER — MIDAZOLAM HYDROCHLORIDE 1 MG/ML
1 INJECTION INTRAMUSCULAR; INTRAVENOUS
Status: DISCONTINUED | OUTPATIENT
Start: 2023-06-12 | End: 2023-06-12 | Stop reason: HOSPADM

## 2023-06-12 RX ORDER — ROCURONIUM BROMIDE 10 MG/ML
INJECTION, SOLUTION INTRAVENOUS AS NEEDED
Status: DISCONTINUED | OUTPATIENT
Start: 2023-06-12 | End: 2023-06-12 | Stop reason: SURG

## 2023-06-12 RX ORDER — FENTANYL CITRATE 50 UG/ML
INJECTION, SOLUTION INTRAMUSCULAR; INTRAVENOUS AS NEEDED
Status: DISCONTINUED | OUTPATIENT
Start: 2023-06-12 | End: 2023-06-12 | Stop reason: SURG

## 2023-06-12 RX ORDER — FENTANYL CITRATE 50 UG/ML
50 INJECTION, SOLUTION INTRAMUSCULAR; INTRAVENOUS
Status: DISCONTINUED | OUTPATIENT
Start: 2023-06-12 | End: 2023-06-12 | Stop reason: HOSPADM

## 2023-06-12 RX ORDER — KETOROLAC TROMETHAMINE 30 MG/ML
30 INJECTION, SOLUTION INTRAMUSCULAR; INTRAVENOUS EVERY 6 HOURS PRN
Status: COMPLETED | OUTPATIENT
Start: 2023-06-12 | End: 2023-06-12

## 2023-06-12 RX ORDER — HYDROCODONE BITARTRATE AND ACETAMINOPHEN 7.5; 325 MG/1; MG/1
1 TABLET ORAL EVERY 4 HOURS PRN
Qty: 15 TABLET | Refills: 0 | Status: SHIPPED | OUTPATIENT
Start: 2023-06-12

## 2023-06-12 RX ORDER — LIDOCAINE HYDROCHLORIDE 20 MG/ML
INJECTION, SOLUTION EPIDURAL; INFILTRATION; INTRACAUDAL; PERINEURAL AS NEEDED
Status: DISCONTINUED | OUTPATIENT
Start: 2023-06-12 | End: 2023-06-12 | Stop reason: SURG

## 2023-06-12 RX ORDER — MIDAZOLAM HYDROCHLORIDE 1 MG/ML
INJECTION INTRAMUSCULAR; INTRAVENOUS AS NEEDED
Status: DISCONTINUED | OUTPATIENT
Start: 2023-06-12 | End: 2023-06-12 | Stop reason: SURG

## 2023-06-12 RX ORDER — ONDANSETRON 4 MG/1
4 TABLET, ORALLY DISINTEGRATING ORAL EVERY 8 HOURS PRN
Qty: 10 TABLET | Refills: 0 | Status: SHIPPED | OUTPATIENT
Start: 2023-06-12

## 2023-06-12 RX ORDER — SODIUM CHLORIDE 0.9 % (FLUSH) 0.9 %
10 SYRINGE (ML) INJECTION AS NEEDED
Status: DISCONTINUED | OUTPATIENT
Start: 2023-06-12 | End: 2023-06-12 | Stop reason: HOSPADM

## 2023-06-12 RX ORDER — PROPOFOL 10 MG/ML
INJECTION, EMULSION INTRAVENOUS AS NEEDED
Status: DISCONTINUED | OUTPATIENT
Start: 2023-06-12 | End: 2023-06-12 | Stop reason: SURG

## 2023-06-12 RX ORDER — MAGNESIUM HYDROXIDE 1200 MG/15ML
LIQUID ORAL AS NEEDED
Status: DISCONTINUED | OUTPATIENT
Start: 2023-06-12 | End: 2023-06-12 | Stop reason: HOSPADM

## 2023-06-12 RX ORDER — MEPERIDINE HYDROCHLORIDE 25 MG/ML
12.5 INJECTION INTRAMUSCULAR; INTRAVENOUS; SUBCUTANEOUS
Status: DISCONTINUED | OUTPATIENT
Start: 2023-06-12 | End: 2023-06-12 | Stop reason: HOSPADM

## 2023-06-12 RX ORDER — OXYMETAZOLINE HYDROCHLORIDE 0.05 G/100ML
SPRAY NASAL AS NEEDED
Status: DISCONTINUED | OUTPATIENT
Start: 2023-06-12 | End: 2023-06-12 | Stop reason: HOSPADM

## 2023-06-12 RX ORDER — ONDANSETRON 2 MG/ML
4 INJECTION INTRAMUSCULAR; INTRAVENOUS AS NEEDED
Status: DISCONTINUED | OUTPATIENT
Start: 2023-06-12 | End: 2023-06-12 | Stop reason: HOSPADM

## 2023-06-12 RX ORDER — OXYCODONE HYDROCHLORIDE AND ACETAMINOPHEN 5; 325 MG/1; MG/1
1 TABLET ORAL ONCE AS NEEDED
Status: COMPLETED | OUTPATIENT
Start: 2023-06-12 | End: 2023-06-12

## 2023-06-12 RX ORDER — NEOSTIGMINE METHYLSULFATE 1 MG/ML
INJECTION, SOLUTION INTRAVENOUS AS NEEDED
Status: DISCONTINUED | OUTPATIENT
Start: 2023-06-12 | End: 2023-06-12 | Stop reason: SURG

## 2023-06-12 RX ORDER — SODIUM CHLORIDE, SODIUM LACTATE, POTASSIUM CHLORIDE, CALCIUM CHLORIDE 600; 310; 30; 20 MG/100ML; MG/100ML; MG/100ML; MG/100ML
125 INJECTION, SOLUTION INTRAVENOUS ONCE
Status: COMPLETED | OUTPATIENT
Start: 2023-06-12 | End: 2023-06-12

## 2023-06-12 RX ORDER — OXYMETAZOLINE HYDROCHLORIDE 0.05 G/100ML
2 SPRAY NASAL 2 TIMES DAILY
Status: DISCONTINUED | OUTPATIENT
Start: 2023-06-12 | End: 2023-06-12 | Stop reason: HOSPADM

## 2023-06-12 RX ORDER — GLYCOPYRROLATE 0.2 MG/ML
INJECTION INTRAMUSCULAR; INTRAVENOUS AS NEEDED
Status: DISCONTINUED | OUTPATIENT
Start: 2023-06-12 | End: 2023-06-12 | Stop reason: SURG

## 2023-06-12 RX ORDER — SODIUM CHLORIDE 9 MG/ML
40 INJECTION, SOLUTION INTRAVENOUS AS NEEDED
Status: DISCONTINUED | OUTPATIENT
Start: 2023-06-12 | End: 2023-06-12 | Stop reason: HOSPADM

## 2023-06-12 RX ORDER — IPRATROPIUM BROMIDE AND ALBUTEROL SULFATE 2.5; .5 MG/3ML; MG/3ML
3 SOLUTION RESPIRATORY (INHALATION) ONCE AS NEEDED
Status: DISCONTINUED | OUTPATIENT
Start: 2023-06-12 | End: 2023-06-12 | Stop reason: HOSPADM

## 2023-06-12 RX ORDER — DEXAMETHASONE SODIUM PHOSPHATE 4 MG/ML
INJECTION, SOLUTION INTRA-ARTICULAR; INTRALESIONAL; INTRAMUSCULAR; INTRAVENOUS; SOFT TISSUE AS NEEDED
Status: DISCONTINUED | OUTPATIENT
Start: 2023-06-12 | End: 2023-06-12 | Stop reason: SURG

## 2023-06-12 RX ORDER — CEPHALEXIN 500 MG/1
500 CAPSULE ORAL 3 TIMES DAILY
Qty: 30 CAPSULE | Refills: 0 | Status: SHIPPED | OUTPATIENT
Start: 2023-06-12

## 2023-06-12 RX ORDER — SODIUM CHLORIDE, SODIUM LACTATE, POTASSIUM CHLORIDE, CALCIUM CHLORIDE 600; 310; 30; 20 MG/100ML; MG/100ML; MG/100ML; MG/100ML
100 INJECTION, SOLUTION INTRAVENOUS ONCE AS NEEDED
Status: DISCONTINUED | OUTPATIENT
Start: 2023-06-12 | End: 2023-06-12 | Stop reason: HOSPADM

## 2023-06-12 RX ORDER — LIDOCAINE HYDROCHLORIDE AND EPINEPHRINE 10; 10 MG/ML; UG/ML
INJECTION, SOLUTION INFILTRATION; PERINEURAL AS NEEDED
Status: DISCONTINUED | OUTPATIENT
Start: 2023-06-12 | End: 2023-06-12 | Stop reason: HOSPADM

## 2023-06-12 RX ORDER — SODIUM CHLORIDE 0.9 % (FLUSH) 0.9 %
10 SYRINGE (ML) INJECTION EVERY 12 HOURS SCHEDULED
Status: DISCONTINUED | OUTPATIENT
Start: 2023-06-12 | End: 2023-06-12 | Stop reason: HOSPADM

## 2023-06-12 RX ADMIN — NEOSTIGMINE METHYLSULFATE 3 MG: 0.5 INJECTION INTRAVENOUS at 13:38

## 2023-06-12 RX ADMIN — OXYCODONE HYDROCHLORIDE AND ACETAMINOPHEN 1 TABLET: 5; 325 TABLET ORAL at 14:29

## 2023-06-12 RX ADMIN — MIDAZOLAM HYDROCHLORIDE 2 MG: 1 INJECTION, SOLUTION INTRAMUSCULAR; INTRAVENOUS at 12:19

## 2023-06-12 RX ADMIN — SODIUM CHLORIDE, POTASSIUM CHLORIDE, SODIUM LACTATE AND CALCIUM CHLORIDE: 600; 310; 30; 20 INJECTION, SOLUTION INTRAVENOUS at 13:43

## 2023-06-12 RX ADMIN — LIDOCAINE HYDROCHLORIDE 60 MG: 20 INJECTION, SOLUTION EPIDURAL; INFILTRATION; INTRACAUDAL; PERINEURAL at 12:22

## 2023-06-12 RX ADMIN — ROCURONIUM BROMIDE 30 MG: 10 SOLUTION INTRAVENOUS at 12:24

## 2023-06-12 RX ADMIN — ONDANSETRON 4 MG: 2 INJECTION INTRAMUSCULAR; INTRAVENOUS at 12:19

## 2023-06-12 RX ADMIN — FENTANYL CITRATE 50 MCG: 50 INJECTION, SOLUTION INTRAMUSCULAR; INTRAVENOUS at 14:27

## 2023-06-12 RX ADMIN — PROPOFOL 200 MG: 10 INJECTION, EMULSION INTRAVENOUS at 12:24

## 2023-06-12 RX ADMIN — FENTANYL CITRATE 100 MCG: 50 INJECTION INTRAMUSCULAR; INTRAVENOUS at 12:19

## 2023-06-12 RX ADMIN — DEXAMETHASONE SODIUM PHOSPHATE 4 MG: 4 INJECTION INTRA-ARTICULAR; INTRALESIONAL; INTRAMUSCULAR; INTRAVENOUS; SOFT TISSUE at 12:19

## 2023-06-12 RX ADMIN — MEPERIDINE HYDROCHLORIDE 12.5 MG: 25 INJECTION INTRAMUSCULAR; INTRAVENOUS; SUBCUTANEOUS at 14:19

## 2023-06-12 RX ADMIN — GLYCOPYRROLATE 0.4 MG: 0.4 INJECTION INTRAMUSCULAR; INTRAVENOUS at 13:38

## 2023-06-12 RX ADMIN — FENTANYL CITRATE 50 MCG: 50 INJECTION, SOLUTION INTRAMUSCULAR; INTRAVENOUS at 14:16

## 2023-06-12 RX ADMIN — SODIUM CHLORIDE, POTASSIUM CHLORIDE, SODIUM LACTATE AND CALCIUM CHLORIDE: 600; 310; 30; 20 INJECTION, SOLUTION INTRAVENOUS at 12:19

## 2023-06-12 RX ADMIN — ONDANSETRON 4 MG: 2 INJECTION INTRAMUSCULAR; INTRAVENOUS at 14:27

## 2023-06-12 RX ADMIN — KETOROLAC TROMETHAMINE 30 MG: 30 INJECTION, SOLUTION INTRAMUSCULAR; INTRAVENOUS at 14:27

## 2023-06-12 RX ADMIN — Medication 2 SPRAY: at 10:18

## 2023-06-12 NOTE — ANESTHESIA PREPROCEDURE EVALUATION
Anesthesia Evaluation     Patient summary reviewed and Nursing notes reviewed   no history of anesthetic complications:   NPO Solid Status: > 8 hours  NPO Liquid Status: > 8 hours           Airway   Mallampati: II  TM distance: >3 FB  Neck ROM: full  No difficulty expected  Dental - normal exam     Pulmonary - negative pulmonary ROS and normal exam    breath sounds clear to auscultation  Cardiovascular - normal exam    Rhythm: regular  Rate: normal    (+) dysrhythmias Tachycardia      Neuro/Psych- negative ROS  GI/Hepatic/Renal/Endo    (+) GERD    Musculoskeletal (-) negative ROS    Abdominal  - normal exam   Substance History - negative use     OB/GYN negative ob/gyn ROS         Other - negative ROS                     Anesthesia Plan    ASA 2     general     intravenous induction     Anesthetic plan, risks, benefits, and alternatives have been provided, discussed and informed consent has been obtained with: patient.    Use of blood products discussed with patient  Consented to blood products.      CODE STATUS:

## 2023-06-12 NOTE — ANESTHESIA POSTPROCEDURE EVALUATION
Patient: Jan Chavez    Procedure Summary       Date: 06/12/23 Room / Location: [facility] OR 09 /  [facility] OR    Anesthesia Start: 1220 Anesthesia Stop: 1346    Procedure: SEPTOPLASTY SUBMUCOSAL RESECTION OF INFERIOR TURBINATES WITH NASAL VALVE IMPLANT( WITH LATERAL CRURAL GRAFTS) (Nose) Diagnosis:       DNS (deviated nasal septum)      Nasal valve stenosis      Hypertrophy of nasal turbinates      (DNS (deviated nasal septum) [J34.2])      (Nasal valve stenosis [J34.89])      (Hypertrophy of nasal turbinates [J34.3])    Surgeons: Brett Hill MD Provider: Ben Levy MD    Anesthesia Type: general ASA Status: 2            Anesthesia Type: general    Vitals  Vitals Value Taken Time   /93 06/12/23 1431   Temp 97.7 °F (36.5 °C) 06/12/23 1352   Pulse 73 06/12/23 1431   Resp 18 06/12/23 1431   SpO2 97 % 06/12/23 1431           Post Anesthesia Care and Evaluation    Patient location during evaluation: PACU  Patient participation: complete - patient participated  Level of consciousness: awake  Pain score: 3  Pain management: adequate    Airway patency: patent  Anesthetic complications: No anesthetic complications  PONV Status: controlled  Cardiovascular status: acceptable and blood pressure returned to baseline  Respiratory status: acceptable and room air  Hydration status: acceptable    Comments: Patient comfortable with discharge at this time.

## 2023-06-12 NOTE — OP NOTE
Procedure Note    Surgeon: Dr. Hill    Pre-op Diagnosis: Deviated nasal septum, nasal vestibular stenosis, inferior turbinate hypertrophy    Post-op Diagnosis: Same    Procedure Performed: Repair of nasal valve stenosis with lateral crural grafts bilaterally, septoplasty, submucosal reduction of inferior turbinates     Indications: Medic nasal obstruction with probable apnea that is not responded to aggressive medical management including nasal sprays, antihistamines, antibiotics.  Marked nasal valve collapse and stenosis noted bilaterally with positive Quynh maneuver.  Right deviated septum large turbinates very narrow inlet bilaterally.       General endotracheal anesthesia    Procedure Details: Afrin spray utilized preoperatively and intraoperatively.  1% lidocaine with epinephrine was injected along the septum on both sides, the inferior turbinates, and the nasal vestibule specially along the lower lateral cartilages along the lateral edge bilaterally.  Small incision was made at the posterior lateral aspect of each lateral cartilage along the lateral crura but her skin was elevated off the lateral crura on each side for about half a centimeter.  Then with blunt and sharp dissection a tunnel was made down to the area directly in line with the lateral crura, overlying the piriform aperture and the maxillary bone.  Next a left hemitransfixion incision was made and septoplasty performed.  Mucoperichondrial was elevated, Reji elevator and [place] elevators were used.  The bone cartilaginous junction were  and atraumatic scissors were used to cut the perpendicular plate and remove spur inferiorly.  Cartilage was elevated off the nasal crest and that was removed to so the cartilage was set back more in the midline.  A 3 cm x 1.8 cm portion of cartilage was taken sparing a generous dorsal and caudal strut.  This was cut in half and then used as the lateral crural grafts.  5-0 PDS was used through and  through the lateral crura on each side and then through the graft in 2 spots as well as the knot was turned between the 2 layers of cartilage.  This was sutured down and the grafts were advanced overlying the piriform aperture and the wounds were closed with 3-0 chromic.  Excellent stiffening and alignment were noted.  Extra cartilage was morselized and placed back in the mucoperichondrial envelope and then this was closed with 3-0 chromic.  Silastic splints were coated with mupirocin and sutured in with 2-0 nylon.  Stab incisions were made anterior to each inferior turbinate and the mucoperiosteum was elevated with a [place] elevator and a Reji elevator.  2 mm microdebrider blade was used for submucosal resection and then the incision site was cauterized and the inferior turbinates were outfractured.  Patient woken up taken to recovery room uneventfully after Afrin pledgets were placed.    Findings: Nasal valve collapse with significant deviated septum to the left and very large inferior turbinates significant improvement with surgery    Estimated Blood Loss:  minimal           Drains: Splints bilaterally           Specimens: None           Implants: 0           Complications: 0           Disposition: PACU - hemodynamically stable.           Condition: stable

## 2023-12-20 ENCOUNTER — OFFICE VISIT (OUTPATIENT)
Dept: GASTROENTEROLOGY | Facility: CLINIC | Age: 28
End: 2023-12-20
Payer: COMMERCIAL

## 2023-12-20 VITALS
BODY MASS INDEX: 30.81 KG/M2 | HEIGHT: 70 IN | DIASTOLIC BLOOD PRESSURE: 87 MMHG | HEART RATE: 79 BPM | TEMPERATURE: 97.7 F | SYSTOLIC BLOOD PRESSURE: 140 MMHG | WEIGHT: 215.2 LBS

## 2023-12-20 DIAGNOSIS — R07.89 CHEST PAIN, ATYPICAL: ICD-10-CM

## 2023-12-20 DIAGNOSIS — K21.9 GASTROESOPHAGEAL REFLUX DISEASE, UNSPECIFIED WHETHER ESOPHAGITIS PRESENT: Primary | ICD-10-CM

## 2023-12-20 PROCEDURE — 99203 OFFICE O/P NEW LOW 30 MIN: CPT | Performed by: NURSE PRACTITIONER

## 2023-12-20 NOTE — PROGRESS NOTES
GASTROENTEROLOGY OFFICE NOTE  Jan Chavez  [id number]  1995    CARE TEAM  Patient Care Team:  Meg Leo APRN as PCP - General (Nurse Practitioner)  Yonathan Beth MD as Consulting Physician (Cardiac Electrophysiology)  Vadim Vivas MD as Consulting Physician (Cardiology)    Referring Provider: Meg Leo APRN    Chief Complaint   Patient presents with    Heartburn        HISTORY OF PRESENT ILLNESS:  Patient is a 28-year-old male presenting today with complaints of chest pain.  Symptoms started about 7 months ago.  He was having heart palpitations.  Episodes lasting 10 to 30 minutes with a heart rate of 160-170 bpm.  He has been evaluated by the emergency department and cardiology.  He has been found to have tachycardia.  He was started on metoprolol and his episodes of tachycardia have decreased.  He has been having chest pain without tachycardia as well.  He describes this pain to me as a burning type sensation.  He was started on a proton pump inhibitor, omeprazole which has resolved these additional pains described as a burning type sensation unless he eats spicy foods or unless he goes a day without taking omeprazole then the burning pain returns.    Additionally, he complains of another type of chest pain that is described as a sharp pain in his chest wall that occurs with movement.  One example he gives is when he goes to push himself up from a sitting position to a standing position using his arms he will feel this sharp pain in his chest.    He denies dysphagia, odynophagia, early satiety, nausea or vomiting.    PAST MEDICAL HISTORY  Past Medical History:   Diagnosis Date    GERD (gastroesophageal reflux disease)     SVT (supraventricular tachycardia)         PAST SURGICAL HISTORY  Past Surgical History:   Procedure Laterality Date    NASAL SEPTOPLASTY W/ TURBINOPLASTY N/A 6/12/2023    Procedure: SEPTOPLASTY SUBMUCOSAL RESECTION OF INFERIOR TURBINATES WITH NASAL VALVE IMPLANT(  WITH LATERAL CRURAL GRAFTS);  Surgeon: Brett Hill MD;  Location: [facility];  Service: ENT;  Laterality: N/A;        MEDICATIONS:    Current Outpatient Medications:     omeprazole (priLOSEC) 40 MG capsule, Take 1 capsule by mouth Daily., Disp: 90 capsule, Rfl: 3    bisoprolol (ZEBeta) 5 MG tablet, Take 1.5 tablets by mouth every night at bedtime. (Patient not taking: Reported on 12/20/2023), Disp: 45 tablet, Rfl: 5    cephalexin (KEFLEX) 500 MG capsule, Take 1 capsule by mouth 3 (Three) Times a Day. (Patient not taking: Reported on 12/20/2023), Disp: 30 capsule, Rfl: 0    FLUoxetine (PROzac) 20 MG capsule, Take 1 capsule by mouth Daily. (Patient not taking: Reported on 12/20/2023), Disp: , Rfl:     HYDROcodone-acetaminophen (NORCO) 7.5-325 MG per tablet, Take 1 tablet by mouth Every 4 (Four) Hours As Needed for Moderate Pain (Pain). (Patient not taking: Reported on 12/20/2023), Disp: 15 tablet, Rfl: 0    loratadine (CLARITIN) 10 MG tablet, TAKE 1 TABLET BY MOUTH AT AT BEDTIME (Patient not taking: Reported on 12/20/2023), Disp: , Rfl:     metoprolol tartrate (LOPRESSOR) 25 MG tablet, Take 1 tablet by mouth 2 (Two) Times a Day. (Patient not taking: Reported on 12/20/2023), Disp: , Rfl:     mupirocin (BACTROBAN) 2 % nasal ointment, 1 application into the nostril(s) as directed by provider 3 (Three) Times a Day. Applied by finger-to-nose 3 times a day for 3 weeks (Patient not taking: Reported on 12/20/2023), Disp: 22 g, Rfl: 2    ondansetron ODT (ZOFRAN-ODT) 4 MG disintegrating tablet, Place 1 tablet on the tongue Every 8 (Eight) Hours As Needed for Nausea or Vomiting. (Patient not taking: Reported on 12/20/2023), Disp: 10 tablet, Rfl: 0    ALLERGIES  No Known Allergies    FAMILY HISTORY:  Family History   Problem Relation Age of Onset    Hypertension Father     Autoimmune disease Father     Heart failure Paternal Grandfather     Heart attack Paternal Grandfather     Colon cancer Neg Hx        SOCIAL  "HISTORY  Social History     Socioeconomic History    Marital status: Single   Tobacco Use    Smoking status: Former     Types: Cigarettes     Passive exposure: Never    Smokeless tobacco: Current     Types: Snuff    Tobacco comments:     Dipped at 0800   Vaping Use    Vaping Use: Never used   Substance and Sexual Activity    Alcohol use: Never    Drug use: Never    Sexual activity: Defer         PHYSICAL EXAM   /87 (BP Location: Right arm, Patient Position: Sitting, Cuff Size: Adult)   Pulse 79   Temp 97.7 °F (36.5 °C) (Temporal)   Ht 177.8 cm (70\")   Wt 97.6 kg (215 lb 3.2 oz)   BMI 30.88 kg/m²   Physical Exam  Vitals and nursing note reviewed.   Constitutional:       Appearance: Normal appearance. He is well-developed.   HENT:      Head: Normocephalic and atraumatic.      Nose: Nose normal.      Mouth/Throat:      Mouth: Mucous membranes are moist.      Pharynx: Oropharynx is clear.   Eyes:      Pupils: Pupils are equal, round, and reactive to light.   Cardiovascular:      Rate and Rhythm: Normal rate and regular rhythm.   Pulmonary:      Effort: Pulmonary effort is normal.      Breath sounds: Normal breath sounds. No wheezing or rales.   Abdominal:      General: Bowel sounds are normal.      Palpations: Abdomen is soft. There is no mass.      Tenderness: There is no abdominal tenderness. There is no guarding or rebound.      Hernia: No hernia is present.   Musculoskeletal:         General: Normal range of motion.        Arms:       Cervical back: Normal range of motion and neck supple.      Comments: Tenderness to chest wall on palpation   Skin:     General: Skin is warm and dry.   Neurological:      Mental Status: He is alert and oriented to person, place, and time.      Cranial Nerves: No cranial nerve deficit.   Psychiatric:         Behavior: Behavior normal.         Judgment: Judgment normal.         ASSESSMENT / PLAN  1.  GERD  - Controlled well with omeprazole 40 mg daily; continue omeprazole  2. "  Atypical chest pain  - Chest pain reproducible today on physical examination with palpation, consistent with musculoskeletal pain      I discussed the patients findings and my recommendations with patient    Selvin Pickens, APRN

## 2024-01-22 RX ORDER — OMEPRAZOLE 40 MG/1
40 CAPSULE, DELAYED RELEASE ORAL DAILY
Qty: 90 CAPSULE | Refills: 3 | Status: SHIPPED | OUTPATIENT
Start: 2024-01-22

## 2024-07-19 ENCOUNTER — OFFICE VISIT (OUTPATIENT)
Dept: GASTROENTEROLOGY | Facility: CLINIC | Age: 29
End: 2024-07-19
Payer: COMMERCIAL

## 2024-07-19 DIAGNOSIS — R19.7 DIARRHEA, UNSPECIFIED TYPE: ICD-10-CM

## 2024-07-19 DIAGNOSIS — K29.70 GASTRITIS WITHOUT BLEEDING, UNSPECIFIED CHRONICITY, UNSPECIFIED GASTRITIS TYPE: ICD-10-CM

## 2024-07-19 DIAGNOSIS — R10.13 EPIGASTRIC PAIN: ICD-10-CM

## 2024-07-19 DIAGNOSIS — K21.9 GASTROESOPHAGEAL REFLUX DISEASE, UNSPECIFIED WHETHER ESOPHAGITIS PRESENT: Primary | ICD-10-CM

## 2024-07-19 DIAGNOSIS — K92.1 MELENA: ICD-10-CM

## 2024-07-19 PROCEDURE — 99214 OFFICE O/P EST MOD 30 MIN: CPT | Performed by: NURSE PRACTITIONER

## 2024-07-23 VITALS
WEIGHT: 211 LBS | BODY MASS INDEX: 31.25 KG/M2 | HEART RATE: 90 BPM | SYSTOLIC BLOOD PRESSURE: 129 MMHG | HEIGHT: 69 IN | DIASTOLIC BLOOD PRESSURE: 79 MMHG

## 2024-07-23 RX ORDER — SUCRALFATE 1 G/1
1 TABLET ORAL 4 TIMES DAILY
Qty: 40 TABLET | Refills: 0 | Status: SHIPPED | OUTPATIENT
Start: 2024-07-23 | End: 2024-08-02

## 2024-07-23 RX ORDER — OMEPRAZOLE 40 MG/1
40 CAPSULE, DELAYED RELEASE ORAL DAILY
Qty: 30 CAPSULE | Refills: 3 | Status: SHIPPED | OUTPATIENT
Start: 2024-07-23

## 2024-07-23 NOTE — H&P (VIEW-ONLY)
DATE OF CONSULTATION:  7/19/24    REASON FOR REFERRAL: GERD    CHIEF COMPLAINT:   GERD, epigastric pain, melena     HISTORY OF PRESENT ILLNESS:   Jan Chavez is a very pleasant 28 y.o. male who is being seen today for evaluation and treatment of GERD. Mr. Chavez reports having chronic difficulty with GERD for several years.  Of note, he retains his gallbladder.  In the past, he took Pepcid and Tums frequently without improvement.  He reports being started on omeprazole 40 mg p.o. daily~10 months ago which controlled GERD well. Patient reports he tried to wean himself off of PPI therapy and stopped taking omeprazole~2 months ago.  Unfortunately, he is again now struggling with GERD and reports having 3-4 flares per week with burning in his throat/chest, epigastric pain and regurgitation.  He denies having nausea or vomiting.  Denies dysphagia.  He admits to taking Goody powder or ibuprofen at least 2 times per week for migraines.  Last week, he reports having melena x 3 days along with worsening epigastric pain.  He is currently having daily BMs with stool type V-VI on Schuyler stool scale.  He has not had any recurrent episodes of melena.  He denies having weight loss.  He is without family history of colon cancer.  He has no other complaints today.    PAST MEDICAL HISTORY:  Past Medical History:   Diagnosis Date    GERD (gastroesophageal reflux disease)     SVT (supraventricular tachycardia)        PAST SURGICAL HISTORY:  Past Surgical History:   Procedure Laterality Date    NASAL SEPTOPLASTY W/ TURBINOPLASTY N/A 6/12/2023    Procedure: SEPTOPLASTY SUBMUCOSAL RESECTION OF INFERIOR TURBINATES WITH NASAL VALVE IMPLANT( WITH LATERAL CRURAL GRAFTS);  Surgeon: Brett Hill MD;  Location: [facility];  Service: ENT;  Laterality: N/A;       FAMILY HISTORY:  Family History   Problem Relation Age of Onset    Hypertension Father     Autoimmune disease Father     Heart failure Paternal Grandfather     Heart  attack Paternal Grandfather     Colon cancer Neg Hx        SOCIAL HISTORY:  Social History     Socioeconomic History    Marital status: Single   Tobacco Use    Smoking status: Former     Types: Cigarettes     Passive exposure: Never    Smokeless tobacco: Current     Types: Snuff    Tobacco comments:     Dipped at 0800   Vaping Use    Vaping status: Never Used   Substance and Sexual Activity    Alcohol use: Never    Drug use: Never    Sexual activity: Defer     MEDICATIONS:  The current medication list was reviewed in the EMR    Current Outpatient Medications:     bisoprolol (ZEBeta) 5 MG tablet, Take 1.5 tablets by mouth every night at bedtime. (Patient not taking: Reported on 12/20/2023), Disp: 45 tablet, Rfl: 5    cephalexin (KEFLEX) 500 MG capsule, Take 1 capsule by mouth 3 (Three) Times a Day. (Patient not taking: Reported on 12/20/2023), Disp: 30 capsule, Rfl: 0    FLUoxetine (PROzac) 20 MG capsule, Take 1 capsule by mouth Daily. (Patient not taking: Reported on 12/20/2023), Disp: , Rfl:     HYDROcodone-acetaminophen (NORCO) 7.5-325 MG per tablet, Take 1 tablet by mouth Every 4 (Four) Hours As Needed for Moderate Pain (Pain). (Patient not taking: Reported on 12/20/2023), Disp: 15 tablet, Rfl: 0    loratadine (CLARITIN) 10 MG tablet, TAKE 1 TABLET BY MOUTH AT AT BEDTIME (Patient not taking: Reported on 12/20/2023), Disp: , Rfl:     metoprolol tartrate (LOPRESSOR) 25 MG tablet, Take 1 tablet by mouth 2 (Two) Times a Day. (Patient not taking: Reported on 12/20/2023), Disp: , Rfl:     mupirocin (BACTROBAN) 2 % nasal ointment, 1 application into the nostril(s) as directed by provider 3 (Three) Times a Day. Applied by finger-to-nose 3 times a day for 3 weeks (Patient not taking: Reported on 12/20/2023), Disp: 22 g, Rfl: 2    omeprazole (priLOSEC) 40 MG capsule, Take 1 capsule by mouth Daily., Disp: 90 capsule, Rfl: 3    ondansetron ODT (ZOFRAN-ODT) 4 MG disintegrating tablet, Place 1 tablet on the tongue Every 8  "(Eight) Hours As Needed for Nausea or Vomiting. (Patient not taking: Reported on 12/20/2023), Disp: 10 tablet, Rfl: 0    sucralfate (Carafate) 1 g tablet, Take 1 tablet by mouth 4 (Four) Times a Day for 10 days., Disp: 40 tablet, Rfl: 0    ALLERGIES:  No Known Allergies      REVIEW OF SYSTEMS:    A comprehensive 14 point review of systems was performed.  Significant findings as mentioned above.  All other systems reviewed and are negative.        Physical Exam   Vital Signs: /79   Pulse 90   Ht 175.3 cm (69\")   Wt 95.7 kg (211 lb)   BMI 31.16 kg/m²    General: Well developed, well nourished, alert and oriented x 3, in no acute distress.   Head: ATNC   Eyes: PERRL, No evidence of conjunctivitis.   Nose: No nasal discharge.   Mouth: Oral mucosal membranes moist. No oral ulceration or hemorrhages.   Neck: Neck supple. No thyromegaly. No JVD.   Lungs: Clear in all fields to A&P without rales, rhonchi or wheezing.   Heart: Regular rate and rhythm. No murmurs, rubs, or gallops.   Abdomen: Soft. Bowel sounds are normoactive. Nontender with palpation.   Extremities: No cyanosis or edema.  Neurologic: Grossly non-focal exam      RECENT LABS:  Lab Results   Component Value Date    WBC 7.31 06/12/2023    HGB 15.0 06/12/2023    HCT 45.9 06/12/2023    MCV 89.8 06/12/2023    RDW 13.3 06/12/2023     06/12/2023    NEUTRORELPCT 66.5 06/12/2023    LYMPHORELPCT 22.6 06/12/2023    MONORELPCT 7.0 06/12/2023    EOSRELPCT 1.8 06/12/2023    BASORELPCT 0.7 06/12/2023    NEUTROABS 4.87 06/12/2023    LYMPHSABS 1.65 06/12/2023       Lab Results   Component Value Date     06/12/2023    K 4.1 06/12/2023    CO2 26.5 06/12/2023     06/12/2023    BUN 15 06/12/2023    CREATININE 1.01 06/12/2023    GLUCOSE 109 (H) 06/12/2023    CALCIUM 9.4 06/12/2023    ALKPHOS 64 12/23/2022    AST 40 12/23/2022    ALT 71 (H) 12/23/2022    BILITOT 0.3 12/23/2022    ALBUMIN 4.70 12/23/2022    PROTEINTOT 7.6 12/23/2022    MG 2.0 12/23/2022 "       ASSESSMENT & PLAN:  Jan Chavez is a very pleasant 28 y.o. male with    1.  GERD:  2.  Epigastric pain:  3.  Melena (now resolved):    -Recommended patient resume PPI therapy, omeprazole 40 mg p.o. daily which he currently has at home.  Will also provided refills today.  -Patient was advised to avoid NSAIDs/Goody powder as these medications can directly irritate the stomach lining leading to gastritis/inflammation and erosion.  Will give short course of sucralfate.  -Also recommended EGD to evaluate the above issues.  We discussed risks/benefits and patient is agreeable to proceed.  Will schedule.    4.  Diarrhea (mild/tolerable):  -Recommended patient start Citrucel or Metamucil daily to help bulk stool and improve bowel movements.  Will monitor.    Of note, this dictation was completed on 7/23/24 due to global tech outage on 7/19/24.     The patient was in agreement with the plan and all questions were answered to his satisfaction.     Thank you so much for allowing us to participate in the care of Jan Chavez . Please do not hesitate to contact us with any questions or concerns.           Electronically Signed by: VIJI Jolley , July 23, 2024 09:12 EDT       CC:   Meg Leo APRN

## 2024-07-23 NOTE — PROGRESS NOTES
Vitals entered on 7-23-24 reflect for patient on date of visit which was 7-19-24. System was down

## 2024-07-23 NOTE — PROGRESS NOTES
DATE OF CONSULTATION:  7/19/24    REASON FOR REFERRAL: GERD    CHIEF COMPLAINT:   GERD, epigastric pain, melena     HISTORY OF PRESENT ILLNESS:   Jan Chavez is a very pleasant 28 y.o. male who is being seen today for evaluation and treatment of GERD. Mr. Chavez reports having chronic difficulty with GERD for several years.  Of note, he retains his gallbladder.  In the past, he took Pepcid and Tums frequently without improvement.  He reports being started on omeprazole 40 mg p.o. daily~10 months ago which controlled GERD well. Patient reports he tried to wean himself off of PPI therapy and stopped taking omeprazole~2 months ago.  Unfortunately, he is again now struggling with GERD and reports having 3-4 flares per week with burning in his throat/chest, epigastric pain and regurgitation.  He denies having nausea or vomiting.  Denies dysphagia.  He admits to taking Goody powder or ibuprofen at least 2 times per week for migraines.  Last week, he reports having melena x 3 days along with worsening epigastric pain.  He is currently having daily BMs with stool type V-VI on Jerauld stool scale.  He has not had any recurrent episodes of melena.  He denies having weight loss.  He is without family history of colon cancer.  He has no other complaints today.    PAST MEDICAL HISTORY:  Past Medical History:   Diagnosis Date    GERD (gastroesophageal reflux disease)     SVT (supraventricular tachycardia)        PAST SURGICAL HISTORY:  Past Surgical History:   Procedure Laterality Date    NASAL SEPTOPLASTY W/ TURBINOPLASTY N/A 6/12/2023    Procedure: SEPTOPLASTY SUBMUCOSAL RESECTION OF INFERIOR TURBINATES WITH NASAL VALVE IMPLANT( WITH LATERAL CRURAL GRAFTS);  Surgeon: Brett Hill MD;  Location: [facility];  Service: ENT;  Laterality: N/A;       FAMILY HISTORY:  Family History   Problem Relation Age of Onset    Hypertension Father     Autoimmune disease Father     Heart failure Paternal Grandfather     Heart  attack Paternal Grandfather     Colon cancer Neg Hx        SOCIAL HISTORY:  Social History     Socioeconomic History    Marital status: Single   Tobacco Use    Smoking status: Former     Types: Cigarettes     Passive exposure: Never    Smokeless tobacco: Current     Types: Snuff    Tobacco comments:     Dipped at 0800   Vaping Use    Vaping status: Never Used   Substance and Sexual Activity    Alcohol use: Never    Drug use: Never    Sexual activity: Defer     MEDICATIONS:  The current medication list was reviewed in the EMR    Current Outpatient Medications:     bisoprolol (ZEBeta) 5 MG tablet, Take 1.5 tablets by mouth every night at bedtime. (Patient not taking: Reported on 12/20/2023), Disp: 45 tablet, Rfl: 5    cephalexin (KEFLEX) 500 MG capsule, Take 1 capsule by mouth 3 (Three) Times a Day. (Patient not taking: Reported on 12/20/2023), Disp: 30 capsule, Rfl: 0    FLUoxetine (PROzac) 20 MG capsule, Take 1 capsule by mouth Daily. (Patient not taking: Reported on 12/20/2023), Disp: , Rfl:     HYDROcodone-acetaminophen (NORCO) 7.5-325 MG per tablet, Take 1 tablet by mouth Every 4 (Four) Hours As Needed for Moderate Pain (Pain). (Patient not taking: Reported on 12/20/2023), Disp: 15 tablet, Rfl: 0    loratadine (CLARITIN) 10 MG tablet, TAKE 1 TABLET BY MOUTH AT AT BEDTIME (Patient not taking: Reported on 12/20/2023), Disp: , Rfl:     metoprolol tartrate (LOPRESSOR) 25 MG tablet, Take 1 tablet by mouth 2 (Two) Times a Day. (Patient not taking: Reported on 12/20/2023), Disp: , Rfl:     mupirocin (BACTROBAN) 2 % nasal ointment, 1 application into the nostril(s) as directed by provider 3 (Three) Times a Day. Applied by finger-to-nose 3 times a day for 3 weeks (Patient not taking: Reported on 12/20/2023), Disp: 22 g, Rfl: 2    omeprazole (priLOSEC) 40 MG capsule, Take 1 capsule by mouth Daily., Disp: 90 capsule, Rfl: 3    ondansetron ODT (ZOFRAN-ODT) 4 MG disintegrating tablet, Place 1 tablet on the tongue Every 8  "(Eight) Hours As Needed for Nausea or Vomiting. (Patient not taking: Reported on 12/20/2023), Disp: 10 tablet, Rfl: 0    sucralfate (Carafate) 1 g tablet, Take 1 tablet by mouth 4 (Four) Times a Day for 10 days., Disp: 40 tablet, Rfl: 0    ALLERGIES:  No Known Allergies      REVIEW OF SYSTEMS:    A comprehensive 14 point review of systems was performed.  Significant findings as mentioned above.  All other systems reviewed and are negative.        Physical Exam   Vital Signs: /79   Pulse 90   Ht 175.3 cm (69\")   Wt 95.7 kg (211 lb)   BMI 31.16 kg/m²    General: Well developed, well nourished, alert and oriented x 3, in no acute distress.   Head: ATNC   Eyes: PERRL, No evidence of conjunctivitis.   Nose: No nasal discharge.   Mouth: Oral mucosal membranes moist. No oral ulceration or hemorrhages.   Neck: Neck supple. No thyromegaly. No JVD.   Lungs: Clear in all fields to A&P without rales, rhonchi or wheezing.   Heart: Regular rate and rhythm. No murmurs, rubs, or gallops.   Abdomen: Soft. Bowel sounds are normoactive. Nontender with palpation.   Extremities: No cyanosis or edema.  Neurologic: Grossly non-focal exam      RECENT LABS:  Lab Results   Component Value Date    WBC 7.31 06/12/2023    HGB 15.0 06/12/2023    HCT 45.9 06/12/2023    MCV 89.8 06/12/2023    RDW 13.3 06/12/2023     06/12/2023    NEUTRORELPCT 66.5 06/12/2023    LYMPHORELPCT 22.6 06/12/2023    MONORELPCT 7.0 06/12/2023    EOSRELPCT 1.8 06/12/2023    BASORELPCT 0.7 06/12/2023    NEUTROABS 4.87 06/12/2023    LYMPHSABS 1.65 06/12/2023       Lab Results   Component Value Date     06/12/2023    K 4.1 06/12/2023    CO2 26.5 06/12/2023     06/12/2023    BUN 15 06/12/2023    CREATININE 1.01 06/12/2023    GLUCOSE 109 (H) 06/12/2023    CALCIUM 9.4 06/12/2023    ALKPHOS 64 12/23/2022    AST 40 12/23/2022    ALT 71 (H) 12/23/2022    BILITOT 0.3 12/23/2022    ALBUMIN 4.70 12/23/2022    PROTEINTOT 7.6 12/23/2022    MG 2.0 12/23/2022 "       ASSESSMENT & PLAN:  Jan Chavez is a very pleasant 28 y.o. male with    1.  GERD:  2.  Epigastric pain:  3.  Melena (now resolved):    -Recommended patient resume PPI therapy, omeprazole 40 mg p.o. daily which he currently has at home.  Will also provided refills today.  -Patient was advised to avoid NSAIDs/Goody powder as these medications can directly irritate the stomach lining leading to gastritis/inflammation and erosion.  Will give short course of sucralfate.  -Also recommended EGD to evaluate the above issues.  We discussed risks/benefits and patient is agreeable to proceed.  Will schedule.    4.  Diarrhea (mild/tolerable):  -Recommended patient start Citrucel or Metamucil daily to help bulk stool and improve bowel movements.  Will monitor.    Of note, this dictation was completed on 7/23/24 due to global tech outage on 7/19/24.     The patient was in agreement with the plan and all questions were answered to his satisfaction.     Thank you so much for allowing us to participate in the care of Jan Chavez . Please do not hesitate to contact us with any questions or concerns.           Electronically Signed by: VIJI Jolley , July 23, 2024 09:12 EDT       CC:   Meg Leo APRN

## 2024-07-24 PROBLEM — R10.13 EPIGASTRIC PAIN: Status: ACTIVE | Noted: 2024-07-19

## 2024-07-24 PROBLEM — K21.9 GASTROESOPHAGEAL REFLUX DISEASE: Status: ACTIVE | Noted: 2024-07-19

## 2024-07-24 PROBLEM — K92.1 MELENA: Status: ACTIVE | Noted: 2024-07-19

## 2024-07-31 ENCOUNTER — HOSPITAL ENCOUNTER (OUTPATIENT)
Facility: HOSPITAL | Age: 29
Setting detail: HOSPITAL OUTPATIENT SURGERY
Discharge: HOME | End: 2024-07-31
Attending: INTERNAL MEDICINE | Admitting: INTERNAL MEDICINE
Payer: COMMERCIAL

## 2024-07-31 ENCOUNTER — ANESTHESIA EVENT (OUTPATIENT)
Dept: PERIOP | Facility: HOSPITAL | Age: 29
End: 2024-07-31
Payer: COMMERCIAL

## 2024-07-31 ENCOUNTER — ANESTHESIA (OUTPATIENT)
Dept: PERIOP | Facility: HOSPITAL | Age: 29
End: 2024-07-31
Payer: COMMERCIAL

## 2024-07-31 VITALS
HEART RATE: 79 BPM | HEIGHT: 69 IN | RESPIRATION RATE: 19 BRPM | TEMPERATURE: 97.5 F | WEIGHT: 210 LBS | OXYGEN SATURATION: 99 % | BODY MASS INDEX: 31.1 KG/M2 | DIASTOLIC BLOOD PRESSURE: 61 MMHG | SYSTOLIC BLOOD PRESSURE: 119 MMHG

## 2024-07-31 DIAGNOSIS — K21.9 GASTROESOPHAGEAL REFLUX DISEASE, UNSPECIFIED WHETHER ESOPHAGITIS PRESENT: ICD-10-CM

## 2024-07-31 DIAGNOSIS — R10.13 EPIGASTRIC PAIN: ICD-10-CM

## 2024-07-31 DIAGNOSIS — K92.1 MELENA: ICD-10-CM

## 2024-07-31 PROCEDURE — 25810000003 LACTATED RINGERS PER 1000 ML: Performed by: ANESTHESIOLOGY

## 2024-07-31 PROCEDURE — 25010000002 PROPOFOL 10 MG/ML EMULSION: Performed by: NURSE ANESTHETIST, CERTIFIED REGISTERED

## 2024-07-31 RX ORDER — ONDANSETRON 2 MG/ML
4 INJECTION INTRAMUSCULAR; INTRAVENOUS AS NEEDED
Status: DISCONTINUED | OUTPATIENT
Start: 2024-07-31 | End: 2024-07-31 | Stop reason: HOSPADM

## 2024-07-31 RX ORDER — SODIUM CHLORIDE, SODIUM LACTATE, POTASSIUM CHLORIDE, CALCIUM CHLORIDE 600; 310; 30; 20 MG/100ML; MG/100ML; MG/100ML; MG/100ML
100 INJECTION, SOLUTION INTRAVENOUS ONCE AS NEEDED
Status: DISCONTINUED | OUTPATIENT
Start: 2024-07-31 | End: 2024-07-31 | Stop reason: HOSPADM

## 2024-07-31 RX ORDER — SODIUM CHLORIDE 9 MG/ML
40 INJECTION, SOLUTION INTRAVENOUS AS NEEDED
Status: DISCONTINUED | OUTPATIENT
Start: 2024-07-31 | End: 2024-07-31 | Stop reason: HOSPADM

## 2024-07-31 RX ORDER — SODIUM CHLORIDE, SODIUM LACTATE, POTASSIUM CHLORIDE, CALCIUM CHLORIDE 600; 310; 30; 20 MG/100ML; MG/100ML; MG/100ML; MG/100ML
125 INJECTION, SOLUTION INTRAVENOUS ONCE
Status: COMPLETED | OUTPATIENT
Start: 2024-07-31 | End: 2024-07-31

## 2024-07-31 RX ORDER — IPRATROPIUM BROMIDE AND ALBUTEROL SULFATE 2.5; .5 MG/3ML; MG/3ML
3 SOLUTION RESPIRATORY (INHALATION) ONCE AS NEEDED
Status: DISCONTINUED | OUTPATIENT
Start: 2024-07-31 | End: 2024-07-31 | Stop reason: HOSPADM

## 2024-07-31 RX ORDER — PROPOFOL 10 MG/ML
VIAL (ML) INTRAVENOUS AS NEEDED
Status: DISCONTINUED | OUTPATIENT
Start: 2024-07-31 | End: 2024-07-31 | Stop reason: SURG

## 2024-07-31 RX ORDER — SODIUM CHLORIDE 0.9 % (FLUSH) 0.9 %
10 SYRINGE (ML) INJECTION AS NEEDED
Status: DISCONTINUED | OUTPATIENT
Start: 2024-07-31 | End: 2024-07-31 | Stop reason: HOSPADM

## 2024-07-31 RX ORDER — MIDAZOLAM HYDROCHLORIDE 1 MG/ML
1 INJECTION INTRAMUSCULAR; INTRAVENOUS
Status: DISCONTINUED | OUTPATIENT
Start: 2024-07-31 | End: 2024-07-31 | Stop reason: HOSPADM

## 2024-07-31 RX ORDER — FENTANYL CITRATE 50 UG/ML
50 INJECTION, SOLUTION INTRAMUSCULAR; INTRAVENOUS
Status: DISCONTINUED | OUTPATIENT
Start: 2024-07-31 | End: 2024-07-31 | Stop reason: HOSPADM

## 2024-07-31 RX ORDER — SODIUM CHLORIDE 0.9 % (FLUSH) 0.9 %
10 SYRINGE (ML) INJECTION EVERY 12 HOURS SCHEDULED
Status: DISCONTINUED | OUTPATIENT
Start: 2024-07-31 | End: 2024-07-31 | Stop reason: HOSPADM

## 2024-07-31 RX ADMIN — PROPOFOL 150 MG: 10 INJECTION, EMULSION INTRAVENOUS at 12:28

## 2024-07-31 RX ADMIN — PROPOFOL 50 MG: 10 INJECTION, EMULSION INTRAVENOUS at 12:34

## 2024-07-31 RX ADMIN — SODIUM CHLORIDE, POTASSIUM CHLORIDE, SODIUM LACTATE AND CALCIUM CHLORIDE: 600; 310; 30; 20 INJECTION, SOLUTION INTRAVENOUS at 12:24

## 2024-07-31 RX ADMIN — PROPOFOL 50 MG: 10 INJECTION, EMULSION INTRAVENOUS at 12:31

## 2024-07-31 RX ADMIN — PROPOFOL 50 MG: 10 INJECTION, EMULSION INTRAVENOUS at 12:38

## 2024-07-31 RX ADMIN — PROPOFOL 50 MG: 10 INJECTION, EMULSION INTRAVENOUS at 12:36

## 2024-07-31 NOTE — INTERVAL H&P NOTE
H&P updated. The patient was examined and the following changes are noted:  The patient states that the epigastric abdominal pain has improved since stopping NSAIDs.  He is continue to have some breakthrough heartburn on omeprazole.

## 2024-07-31 NOTE — ANESTHESIA PREPROCEDURE EVALUATION
Anesthesia Evaluation     Patient summary reviewed and Nursing notes reviewed   no history of anesthetic complications:   NPO Solid Status: > 8 hours  NPO Liquid Status: > 8 hours           Airway   Mallampati: II  TM distance: >3 FB  Neck ROM: full  No difficulty expected  Dental - normal exam     Pulmonary - negative pulmonary ROS and normal exam    breath sounds clear to auscultation  Cardiovascular - normal exam  Exercise tolerance: good (4-7 METS)    ECG reviewed  Rhythm: regular  Rate: normal    (+) dysrhythmias Tachycardia      Neuro/Psych- negative ROS  GI/Hepatic/Renal/Endo    (+) obesity, GERD    Musculoskeletal (-) negative ROS    Abdominal   (+) obese    Abdomen: soft.   Substance History - negative use     OB/GYN negative ob/gyn ROS         Other - negative ROS       ROS/Med Hx Other:   Inappropriate sinus tachycardia   a. Symptoms of palpitations 7 months ago   b. 30 day Holter monitor 1/2023: Avg HR 75, min HR 45, max . Runs of SVT  c. Started Metoprolol 1/2023  d. Echo 1/31/2023 : normal EF 66-70%, no significant valve disease  e. Stress test 1/2023: LVEF 70%, low risk for ischemic heart disease, myocardial perfusion study indicates small-sized, mildly severe area of ischemia located in anterior wall and lateral wall, impressions consistent with intermediate risk study, normal EKG stress test  f. CTA 3/2023: no significant coronary disease                Anesthesia Plan    ASA 2     general     intravenous induction     Anesthetic plan, risks, benefits, and alternatives have been provided, discussed and informed consent has been obtained with: patient.  Pre-procedure education provided  Use of blood products discussed with patient .    Plan discussed with CRNA.      CODE STATUS:

## 2024-07-31 NOTE — ANESTHESIA POSTPROCEDURE EVALUATION
Patient: Jan Chavez    Procedure Summary       Date: 07/31/24 Room / Location: [facility] OR  /  [facility] OR    Anesthesia Start: 1224 Anesthesia Stop: 1240    Procedure: ESOPHAGOGASTRODUODENOSCOPY WITH BIOPSY (Esophagus) Diagnosis:       Gastroesophageal reflux disease, unspecified whether esophagitis present      Epigastric pain      Melena      (Gastroesophageal reflux disease, unspecified whether esophagitis present [K21.9])      (Epigastric pain [R10.13])      (Melena [K92.1])    Surgeons: Jaimee Lewis MD Provider: Truong Grover MD    Anesthesia Type: general ASA Status: 2            Anesthesia Type: general    Vitals  Vitals Value Taken Time   /61 07/31/24 1309   Temp 97.5 °F (36.4 °C) 07/31/24 1240   Pulse 79 07/31/24 1309   Resp 19 07/31/24 1309   SpO2 99 % 07/31/24 1309           Post Anesthesia Care and Evaluation    Patient location during evaluation: PACU  Patient participation: complete - patient participated  Level of consciousness: awake  Pain score: 0  Pain management: satisfactory to patient    Airway patency: patent  Anesthetic complications: No anesthetic complications  PONV Status: noneRespiratory status: nasal cannula  Hydration status: acceptable

## 2024-08-01 LAB — REF LAB TEST METHOD: NORMAL

## 2024-08-01 NOTE — PROGRESS NOTES
At the time of your recent upper endoscopy, biopsies were taken of the esophagus.  Biopsies revealed focal chronic inflammation from acid reflux.  Please continue omeprazole 40 mg once daily.  Please keep your follow-up appointment.

## 2025-02-12 ENCOUNTER — TELEPHONE (OUTPATIENT)
Dept: CARDIOLOGY | Facility: CLINIC | Age: 30
End: 2025-02-12

## (undated) DEVICE — TUBING, SUCTION, 1/4" X 20', STRAIGHT: Brand: MEDLINE INDUSTRIES, INC.

## (undated) DEVICE — PK ENT 70

## (undated) DEVICE — NDL SPINE 22G 31/2IN BLK

## (undated) DEVICE — STRIP,CLOSURE,WOUND,MEDI-STRIP,1/2X4: Brand: MEDLINE

## (undated) DEVICE — SUT ETHLN 2/0 FS 18IN 664H

## (undated) DEVICE — NDL SPINE 25G 31/2IN BLU

## (undated) DEVICE — AIRWY NASL ARGYLE 26F LF

## (undated) DEVICE — MARKR SKIN W/RULR AND LBL

## (undated) DEVICE — ADHS LIQ MASTISOL 2/3ML

## (undated) DEVICE — SPLINT 1522000 20PK PAIR SIMPLESPLINTS

## (undated) DEVICE — INTENDED FOR TISSUE SEPARATION, AND OTHER PROCEDURES THAT REQUIRE A SHARP SURGICAL BLADE TO PUNCTURE OR CUT.: Brand: BARD-PARKER ® STAINLESS STEEL BLADES

## (undated) DEVICE — BLADE 1882040HR 5PK M4 INF TURB 2MM ROT: Brand: STRAIGHTSHOT

## (undated) DEVICE — UNDYED BRAIDED (POLYGLACTIN 910), SYNTHETIC ABSORBABLE SUTURE: Brand: COATED VICRYL

## (undated) DEVICE — KT ANTI FOG W/FLD AND SPNG

## (undated) DEVICE — HOLDER: Brand: DEROYAL

## (undated) DEVICE — GLV SURG SENSICARE W/ALOE PF LF 9 STRL

## (undated) DEVICE — SUT ETHLN 3/0 PS2 18 IN 1669H

## (undated) DEVICE — SHEET,DRAPE,53X77,STERILE: Brand: MEDLINE

## (undated) DEVICE — SUT GUT CHRM 3/0 FS2 27IN 636H

## (undated) DEVICE — PATIENT RETURN ELECTRODE, SINGLE-USE, CONTACT QUALITY MONITORING, ADULT, WITH 9FT CORD, FOR PATIENTS WEIGING OVER 33LBS. (15KG): Brand: MEGADYNE

## (undated) DEVICE — BLADE 1884380HR QUADCUT 5PK 4.3MM X 13CM: Brand: QUADCUT®

## (undated) DEVICE — Device

## (undated) DEVICE — Device: Brand: DEFENDO AIR/WATER/SUCTION AND BIOPSY VALVE

## (undated) DEVICE — THE BITE BLOCK MAXI, LATEX FREE STRAP IS USED TO PROTECT THE ENDOSCOPE INSERTION TUBE FROM BEING BITTEN BY THE PATIENT.

## (undated) DEVICE — FRCP BX RADJAW4 NDL 2.8 240CM LG OG BX40

## (undated) DEVICE — CUFF SCD HEMOFORCE SEQ CALF STD MD

## (undated) DEVICE — PACKING 8004006 NEURAY 200PK 13X51MM: Brand: NEURAY ®